# Patient Record
Sex: FEMALE | Race: WHITE | NOT HISPANIC OR LATINO | Employment: FULL TIME | URBAN - METROPOLITAN AREA
[De-identification: names, ages, dates, MRNs, and addresses within clinical notes are randomized per-mention and may not be internally consistent; named-entity substitution may affect disease eponyms.]

---

## 2018-03-16 ENCOUNTER — OFFICE VISIT CONVERTED (OUTPATIENT)
Dept: FAMILY MEDICINE CLINIC | Facility: CLINIC | Age: 23
End: 2018-03-16
Attending: NURSE PRACTITIONER

## 2018-04-17 ENCOUNTER — OFFICE VISIT CONVERTED (OUTPATIENT)
Dept: FAMILY MEDICINE CLINIC | Facility: CLINIC | Age: 23
End: 2018-04-17
Attending: NURSE PRACTITIONER

## 2018-08-03 ENCOUNTER — CONVERSION ENCOUNTER (OUTPATIENT)
Dept: FAMILY MEDICINE CLINIC | Facility: CLINIC | Age: 23
End: 2018-08-03

## 2018-08-03 ENCOUNTER — OFFICE VISIT CONVERTED (OUTPATIENT)
Dept: FAMILY MEDICINE CLINIC | Facility: CLINIC | Age: 23
End: 2018-08-03
Attending: NURSE PRACTITIONER

## 2018-10-22 ENCOUNTER — CONVERSION ENCOUNTER (OUTPATIENT)
Dept: FAMILY MEDICINE CLINIC | Facility: CLINIC | Age: 23
End: 2018-10-22

## 2018-10-22 ENCOUNTER — OFFICE VISIT CONVERTED (OUTPATIENT)
Dept: FAMILY MEDICINE CLINIC | Facility: CLINIC | Age: 23
End: 2018-10-22
Attending: NURSE PRACTITIONER

## 2018-11-08 ENCOUNTER — OFFICE VISIT CONVERTED (OUTPATIENT)
Dept: FAMILY MEDICINE CLINIC | Facility: CLINIC | Age: 23
End: 2018-11-08
Attending: NURSE PRACTITIONER

## 2018-12-13 ENCOUNTER — CONVERSION ENCOUNTER (OUTPATIENT)
Dept: FAMILY MEDICINE CLINIC | Facility: CLINIC | Age: 23
End: 2018-12-13

## 2018-12-13 ENCOUNTER — OFFICE VISIT CONVERTED (OUTPATIENT)
Dept: FAMILY MEDICINE CLINIC | Facility: CLINIC | Age: 23
End: 2018-12-13
Attending: NURSE PRACTITIONER

## 2019-02-06 ENCOUNTER — OFFICE VISIT CONVERTED (OUTPATIENT)
Dept: FAMILY MEDICINE CLINIC | Facility: CLINIC | Age: 24
End: 2019-02-06
Attending: NURSE PRACTITIONER

## 2019-02-23 ENCOUNTER — HOSPITAL ENCOUNTER (OUTPATIENT)
Dept: URGENT CARE | Facility: CLINIC | Age: 24
Discharge: HOME | End: 2019-02-23
Attending: NURSE PRACTITIONER

## 2019-03-15 ENCOUNTER — CONVERSION ENCOUNTER (OUTPATIENT)
Dept: FAMILY MEDICINE CLINIC | Facility: CLINIC | Age: 24
End: 2019-03-15

## 2019-03-15 ENCOUNTER — OFFICE VISIT CONVERTED (OUTPATIENT)
Dept: FAMILY MEDICINE CLINIC | Facility: CLINIC | Age: 24
End: 2019-03-15
Attending: NURSE PRACTITIONER

## 2019-03-28 ENCOUNTER — HOSPITAL ENCOUNTER (OUTPATIENT)
Dept: URGENT CARE | Facility: CLINIC | Age: 24
Discharge: HOME | End: 2019-03-28
Attending: NURSE PRACTITIONER

## 2019-06-17 ENCOUNTER — HOSPITAL ENCOUNTER (OUTPATIENT)
Dept: URGENT CARE | Facility: CLINIC | Age: 24
Discharge: HOME | End: 2019-06-17
Attending: EMERGENCY MEDICINE

## 2019-06-26 ENCOUNTER — OFFICE VISIT CONVERTED (OUTPATIENT)
Dept: FAMILY MEDICINE CLINIC | Facility: CLINIC | Age: 24
End: 2019-06-26
Attending: NURSE PRACTITIONER

## 2019-07-01 ENCOUNTER — HOSPITAL ENCOUNTER (OUTPATIENT)
Dept: OTHER | Facility: HOSPITAL | Age: 24
Discharge: HOME | End: 2019-07-01
Attending: NURSE PRACTITIONER

## 2019-07-01 LAB — HCG INTACT+B SERPL-ACNC: ABNORMAL M[IU]/ML (ref 0–5)

## 2019-12-27 ENCOUNTER — OFFICE VISIT CONVERTED (OUTPATIENT)
Dept: FAMILY MEDICINE CLINIC | Facility: CLINIC | Age: 24
End: 2019-12-27
Attending: NURSE PRACTITIONER

## 2020-02-10 ENCOUNTER — OFFICE VISIT CONVERTED (OUTPATIENT)
Dept: FAMILY MEDICINE CLINIC | Facility: CLINIC | Age: 25
End: 2020-02-10
Attending: NURSE PRACTITIONER

## 2020-02-18 ENCOUNTER — OFFICE VISIT CONVERTED (OUTPATIENT)
Dept: FAMILY MEDICINE CLINIC | Facility: CLINIC | Age: 25
End: 2020-02-18
Attending: NURSE PRACTITIONER

## 2020-02-24 ENCOUNTER — HOSPITAL ENCOUNTER (OUTPATIENT)
Dept: FAMILY MEDICINE CLINIC | Facility: CLINIC | Age: 25
Discharge: HOME | End: 2020-02-24
Attending: NURSE PRACTITIONER

## 2020-02-24 ENCOUNTER — OFFICE VISIT CONVERTED (OUTPATIENT)
Dept: FAMILY MEDICINE CLINIC | Facility: CLINIC | Age: 25
End: 2020-02-24
Attending: NURSE PRACTITIONER

## 2020-02-25 LAB
ALBUMIN SERPL-MCNC: 4.3 G/DL (ref 3.5–5)
ALBUMIN/GLOB SERPL: 1.3 {RATIO} (ref 1.4–2.6)
ALP SERPL-CCNC: 70 U/L (ref 42–98)
ALT SERPL-CCNC: 15 U/L (ref 10–40)
ANION GAP SERPL CALC-SCNC: 18 MMOL/L (ref 8–19)
AST SERPL-CCNC: 18 U/L (ref 15–50)
BASOPHILS # BLD AUTO: 0.07 10*3/UL (ref 0–0.2)
BASOPHILS NFR BLD AUTO: 0.6 % (ref 0–3)
BILIRUB SERPL-MCNC: <0.15 MG/DL (ref 0.2–1.3)
BUN SERPL-MCNC: 14 MG/DL (ref 5–25)
BUN/CREAT SERPL: 18 {RATIO} (ref 6–20)
CALCIUM SERPL-MCNC: 9.5 MG/DL (ref 8.7–10.4)
CHLORIDE SERPL-SCNC: 104 MMOL/L (ref 99–111)
CONV ABS IMM GRAN: 0.09 10*3/UL (ref 0–0.2)
CONV CO2: 22 MMOL/L (ref 22–32)
CONV IMMATURE GRAN: 0.7 % (ref 0–1.8)
CONV TOTAL PROTEIN: 7.5 G/DL (ref 6.3–8.2)
CREAT UR-MCNC: 0.79 MG/DL (ref 0.5–0.9)
DEPRECATED RDW RBC AUTO: 48.4 FL (ref 36.4–46.3)
EOSINOPHIL # BLD AUTO: 0.15 10*3/UL (ref 0–0.7)
EOSINOPHIL # BLD AUTO: 1.2 % (ref 0–7)
ERYTHROCYTE [DISTWIDTH] IN BLOOD BY AUTOMATED COUNT: 16.1 % (ref 11.7–14.4)
GFR SERPLBLD BASED ON 1.73 SQ M-ARVRAT: >60 ML/MIN/{1.73_M2}
GLOBULIN UR ELPH-MCNC: 3.2 G/DL (ref 2–3.5)
GLUCOSE SERPL-MCNC: 83 MG/DL (ref 65–99)
HCT VFR BLD AUTO: 41.7 % (ref 37–47)
HGB BLD-MCNC: 13 G/DL (ref 12–16)
IRON SATN MFR SERPL: 7 % (ref 20–55)
IRON SERPL-MCNC: 32 UG/DL (ref 60–170)
LYMPHOCYTES # BLD AUTO: 2.53 10*3/UL (ref 1–5)
LYMPHOCYTES NFR BLD AUTO: 20.8 % (ref 20–45)
MCH RBC QN AUTO: 25.4 PG (ref 27–31)
MCHC RBC AUTO-ENTMCNC: 31.2 G/DL (ref 33–37)
MCV RBC AUTO: 81.6 FL (ref 81–99)
MONOCYTES # BLD AUTO: 0.84 10*3/UL (ref 0.2–1.2)
MONOCYTES NFR BLD AUTO: 6.9 % (ref 3–10)
NEUTROPHILS # BLD AUTO: 8.46 10*3/UL (ref 2–8)
NEUTROPHILS NFR BLD AUTO: 69.8 % (ref 30–85)
NRBC CBCN: 0 % (ref 0–0.7)
OSMOLALITY SERPL CALC.SUM OF ELEC: 288 MOSM/KG (ref 273–304)
PLATELET # BLD AUTO: 312 10*3/UL (ref 130–400)
PMV BLD AUTO: 10.5 FL (ref 9.4–12.3)
POTASSIUM SERPL-SCNC: 4.5 MMOL/L (ref 3.5–5.3)
RBC # BLD AUTO: 5.11 10*6/UL (ref 4.2–5.4)
SODIUM SERPL-SCNC: 139 MMOL/L (ref 135–147)
TIBC SERPL-MCNC: 468 UG/DL (ref 245–450)
TRANSFERRIN SERPL-MCNC: 327 MG/DL (ref 250–380)
TSH SERPL-ACNC: 3.29 M[IU]/L (ref 0.27–4.2)
VIT B12 SERPL-MCNC: 396 PG/ML (ref 211–911)
WBC # BLD AUTO: 12.14 10*3/UL (ref 4.8–10.8)

## 2020-02-26 LAB
CONV ANTI MICROSOMAL AB: 58 IU/ML (ref 0–34)
THYROGLOBULIN ANTIBODY: 4.7 IU/ML (ref 0–0.9)

## 2020-03-09 ENCOUNTER — HOSPITAL ENCOUNTER (OUTPATIENT)
Dept: FAMILY MEDICINE CLINIC | Facility: CLINIC | Age: 25
Discharge: HOME | End: 2020-03-09
Attending: NURSE PRACTITIONER

## 2020-03-09 LAB
BASOPHILS # BLD AUTO: 0.09 10*3/UL (ref 0–0.2)
BASOPHILS NFR BLD AUTO: 1 % (ref 0–3)
CONV ABS IMM GRAN: 0.03 10*3/UL (ref 0–0.2)
CONV IMMATURE GRAN: 0.3 % (ref 0–1.8)
DEPRECATED RDW RBC AUTO: 48.8 FL (ref 36.4–46.3)
EOSINOPHIL # BLD AUTO: 0.16 10*3/UL (ref 0–0.7)
EOSINOPHIL # BLD AUTO: 1.7 % (ref 0–7)
ERYTHROCYTE [DISTWIDTH] IN BLOOD BY AUTOMATED COUNT: 16.1 % (ref 11.7–14.4)
HCT VFR BLD AUTO: 41.7 % (ref 37–47)
HGB BLD-MCNC: 12.9 G/DL (ref 12–16)
LYMPHOCYTES # BLD AUTO: 2.99 10*3/UL (ref 1–5)
LYMPHOCYTES NFR BLD AUTO: 32.4 % (ref 20–45)
MCH RBC QN AUTO: 25.7 PG (ref 27–31)
MCHC RBC AUTO-ENTMCNC: 30.9 G/DL (ref 33–37)
MCV RBC AUTO: 83.1 FL (ref 81–99)
MONOCYTES # BLD AUTO: 0.69 10*3/UL (ref 0.2–1.2)
MONOCYTES NFR BLD AUTO: 7.5 % (ref 3–10)
NEUTROPHILS # BLD AUTO: 5.27 10*3/UL (ref 2–8)
NEUTROPHILS NFR BLD AUTO: 57.1 % (ref 30–85)
NRBC CBCN: 0 % (ref 0–0.7)
PLATELET # BLD AUTO: 347 10*3/UL (ref 130–400)
PMV BLD AUTO: 9.7 FL (ref 9.4–12.3)
RBC # BLD AUTO: 5.02 10*6/UL (ref 4.2–5.4)
WBC # BLD AUTO: 9.23 10*3/UL (ref 4.8–10.8)

## 2020-03-19 ENCOUNTER — TELEMEDICINE CONVERTED (OUTPATIENT)
Dept: FAMILY MEDICINE CLINIC | Facility: CLINIC | Age: 25
End: 2020-03-19
Attending: NURSE PRACTITIONER

## 2020-04-08 ENCOUNTER — HOSPITAL ENCOUNTER (OUTPATIENT)
Dept: LAB | Facility: HOSPITAL | Age: 25
Discharge: HOME | End: 2020-04-08
Attending: NURSE PRACTITIONER

## 2020-04-08 LAB
BASOPHILS # BLD AUTO: 0.08 10*3/UL (ref 0–0.2)
BASOPHILS NFR BLD AUTO: 1.1 % (ref 0–3)
CONV ABS IMM GRAN: 0.02 10*3/UL (ref 0–0.2)
CONV IMMATURE GRAN: 0.3 % (ref 0–1.8)
DEPRECATED RDW RBC AUTO: 46 FL (ref 36.4–46.3)
EOSINOPHIL # BLD AUTO: 0.14 10*3/UL (ref 0–0.7)
EOSINOPHIL # BLD AUTO: 1.9 % (ref 0–7)
ERYTHROCYTE [DISTWIDTH] IN BLOOD BY AUTOMATED COUNT: 14.4 % (ref 11.7–14.4)
HCT VFR BLD AUTO: 41.6 % (ref 37–47)
HGB BLD-MCNC: 13 G/DL (ref 12–16)
LYMPHOCYTES # BLD AUTO: 2.63 10*3/UL (ref 1–5)
LYMPHOCYTES NFR BLD AUTO: 35.4 % (ref 20–45)
MCH RBC QN AUTO: 27 PG (ref 27–31)
MCHC RBC AUTO-ENTMCNC: 31.3 G/DL (ref 33–37)
MCV RBC AUTO: 86.3 FL (ref 81–99)
MONOCYTES # BLD AUTO: 0.6 10*3/UL (ref 0.2–1.2)
MONOCYTES NFR BLD AUTO: 8.1 % (ref 3–10)
NEUTROPHILS # BLD AUTO: 3.96 10*3/UL (ref 2–8)
NEUTROPHILS NFR BLD AUTO: 53.2 % (ref 30–85)
NRBC CBCN: 0 % (ref 0–0.7)
PLATELET # BLD AUTO: 317 10*3/UL (ref 130–400)
PMV BLD AUTO: 10 FL (ref 9.4–12.3)
RBC # BLD AUTO: 4.82 10*6/UL (ref 4.2–5.4)
T4 FREE SERPL-MCNC: 1.3 NG/DL (ref 0.9–1.8)
TSH SERPL-ACNC: 2.34 M[IU]/L (ref 0.27–4.2)
WBC # BLD AUTO: 7.43 10*3/UL (ref 4.8–10.8)

## 2020-06-11 ENCOUNTER — OFFICE VISIT CONVERTED (OUTPATIENT)
Dept: FAMILY MEDICINE CLINIC | Facility: CLINIC | Age: 25
End: 2020-06-11
Attending: NURSE PRACTITIONER

## 2020-06-11 ENCOUNTER — HOSPITAL ENCOUNTER (OUTPATIENT)
Dept: FAMILY MEDICINE CLINIC | Facility: CLINIC | Age: 25
Discharge: HOME | End: 2020-06-11
Attending: NURSE PRACTITIONER

## 2020-06-11 ENCOUNTER — CONVERSION ENCOUNTER (OUTPATIENT)
Dept: FAMILY MEDICINE CLINIC | Facility: CLINIC | Age: 25
End: 2020-06-11

## 2020-06-11 LAB
IRON SATN MFR SERPL: 20 % (ref 20–55)
IRON SERPL-MCNC: 82 UG/DL (ref 60–170)
TIBC SERPL-MCNC: 415 UG/DL (ref 245–450)
TRANSFERRIN SERPL-MCNC: 290 MG/DL (ref 250–380)

## 2020-10-08 ENCOUNTER — CONVERSION ENCOUNTER (OUTPATIENT)
Dept: FAMILY MEDICINE CLINIC | Facility: CLINIC | Age: 25
End: 2020-10-08

## 2020-10-08 ENCOUNTER — OFFICE VISIT CONVERTED (OUTPATIENT)
Dept: FAMILY MEDICINE CLINIC | Facility: CLINIC | Age: 25
End: 2020-10-08
Attending: NURSE PRACTITIONER

## 2020-10-09 ENCOUNTER — HOSPITAL ENCOUNTER (OUTPATIENT)
Dept: ULTRASOUND IMAGING | Facility: HOSPITAL | Age: 25
Discharge: HOME | End: 2020-10-09
Attending: NURSE PRACTITIONER

## 2020-10-09 ENCOUNTER — HOSPITAL ENCOUNTER (OUTPATIENT)
Dept: LAB | Facility: HOSPITAL | Age: 25
Discharge: HOME | End: 2020-10-09
Attending: NURSE PRACTITIONER

## 2020-10-09 LAB
ALBUMIN SERPL-MCNC: 4.1 G/DL (ref 3.5–5)
ALBUMIN/GLOB SERPL: 1.3 {RATIO} (ref 1.4–2.6)
ALP SERPL-CCNC: 81 U/L (ref 42–98)
ALT SERPL-CCNC: 12 U/L (ref 10–40)
ANION GAP SERPL CALC-SCNC: 12 MMOL/L (ref 8–19)
AST SERPL-CCNC: 18 U/L (ref 15–50)
BASOPHILS # BLD AUTO: 0.07 10*3/UL (ref 0–0.2)
BASOPHILS NFR BLD AUTO: 0.7 % (ref 0–3)
BILIRUB SERPL-MCNC: 0.35 MG/DL (ref 0.2–1.3)
BUN SERPL-MCNC: 9 MG/DL (ref 5–25)
BUN/CREAT SERPL: 12 {RATIO} (ref 6–20)
CALCIUM SERPL-MCNC: 9.1 MG/DL (ref 8.7–10.4)
CHLORIDE SERPL-SCNC: 105 MMOL/L (ref 99–111)
CHOLEST SERPL-MCNC: 137 MG/DL (ref 107–200)
CHOLEST/HDLC SERPL: 3.9 {RATIO} (ref 3–6)
CONV ABS IMM GRAN: 0.04 10*3/UL (ref 0–0.2)
CONV CO2: 26 MMOL/L (ref 22–32)
CONV IMMATURE GRAN: 0.4 % (ref 0–1.8)
CONV TOTAL PROTEIN: 7.2 G/DL (ref 6.3–8.2)
CREAT UR-MCNC: 0.78 MG/DL (ref 0.5–0.9)
DEPRECATED RDW RBC AUTO: 39 FL (ref 36.4–46.3)
EOSINOPHIL # BLD AUTO: 0.15 10*3/UL (ref 0–0.7)
EOSINOPHIL # BLD AUTO: 1.5 % (ref 0–7)
ERYTHROCYTE [DISTWIDTH] IN BLOOD BY AUTOMATED COUNT: 12 % (ref 11.7–14.4)
GFR SERPLBLD BASED ON 1.73 SQ M-ARVRAT: >60 ML/MIN/{1.73_M2}
GLOBULIN UR ELPH-MCNC: 3.1 G/DL (ref 2–3.5)
GLUCOSE SERPL-MCNC: 60 MG/DL (ref 65–99)
HCT VFR BLD AUTO: 40.8 % (ref 37–47)
HDLC SERPL-MCNC: 35 MG/DL (ref 40–60)
HGB BLD-MCNC: 12.9 G/DL (ref 12–16)
LDLC SERPL CALC-MCNC: 85 MG/DL (ref 70–100)
LYMPHOCYTES # BLD AUTO: 1.88 10*3/UL (ref 1–5)
LYMPHOCYTES NFR BLD AUTO: 19.3 % (ref 20–45)
MCH RBC QN AUTO: 28.4 PG (ref 27–31)
MCHC RBC AUTO-ENTMCNC: 31.6 G/DL (ref 33–37)
MCV RBC AUTO: 89.7 FL (ref 81–99)
MONOCYTES # BLD AUTO: 0.74 10*3/UL (ref 0.2–1.2)
MONOCYTES NFR BLD AUTO: 7.6 % (ref 3–10)
NEUTROPHILS # BLD AUTO: 6.88 10*3/UL (ref 2–8)
NEUTROPHILS NFR BLD AUTO: 70.5 % (ref 30–85)
NRBC CBCN: 0 % (ref 0–0.7)
OSMOLALITY SERPL CALC.SUM OF ELEC: 285 MOSM/KG (ref 273–304)
PLATELET # BLD AUTO: 271 10*3/UL (ref 130–400)
PMV BLD AUTO: 9.7 FL (ref 9.4–12.3)
POTASSIUM SERPL-SCNC: 3.8 MMOL/L (ref 3.5–5.3)
RBC # BLD AUTO: 4.55 10*6/UL (ref 4.2–5.4)
SODIUM SERPL-SCNC: 139 MMOL/L (ref 135–147)
TRIGL SERPL-MCNC: 86 MG/DL (ref 40–150)
TSH SERPL-ACNC: 2.38 M[IU]/L (ref 0.27–4.2)
VLDLC SERPL-MCNC: 17 MG/DL (ref 5–37)
WBC # BLD AUTO: 9.76 10*3/UL (ref 4.8–10.8)

## 2020-10-19 ENCOUNTER — HOSPITAL ENCOUNTER (OUTPATIENT)
Dept: FAMILY MEDICINE CLINIC | Facility: CLINIC | Age: 25
Discharge: HOME | End: 2020-10-19
Attending: NURSE PRACTITIONER

## 2020-10-19 LAB
EST. AVERAGE GLUCOSE BLD GHB EST-MCNC: 97 MG/DL
HBA1C MFR BLD: 5 % (ref 3.5–5.7)

## 2021-04-12 ENCOUNTER — HOSPITAL ENCOUNTER (OUTPATIENT)
Dept: FAMILY MEDICINE CLINIC | Facility: CLINIC | Age: 26
Discharge: HOME | End: 2021-04-12
Attending: NURSE PRACTITIONER

## 2021-04-12 ENCOUNTER — OFFICE VISIT CONVERTED (OUTPATIENT)
Dept: FAMILY MEDICINE CLINIC | Facility: CLINIC | Age: 26
End: 2021-04-12
Attending: NURSE PRACTITIONER

## 2021-05-10 ENCOUNTER — OFFICE VISIT CONVERTED (OUTPATIENT)
Dept: FAMILY MEDICINE CLINIC | Facility: CLINIC | Age: 26
End: 2021-05-10
Attending: NURSE PRACTITIONER

## 2021-05-10 LAB
BILIRUB UR QL STRIP: NEGATIVE
COLOR UR: YELLOW
CONV CLARITY OF URINE: CLEAR
CONV PROTEIN IN URINE BY AUTOMATED TEST STRIP: NEGATIVE
CONV UROBILINOGEN IN URINE BY AUTOMATED TEST STRIP: NORMAL
GLUCOSE UR QL: NEGATIVE
HGB UR QL STRIP: NORMAL
KETONES UR QL STRIP: NORMAL
LEUKOCYTE ESTERASE UR QL STRIP: NEGATIVE
NITRITE UR QL STRIP: NEGATIVE
PH UR STRIP.AUTO: 5.5 [PH]
SP GR UR: 1.02

## 2021-05-13 NOTE — PROGRESS NOTES
Progress Note      Patient Name: Alicia Rodriguez   Patient ID: [id number]   Sex: Female   YOB: 1995    Primary Care Provider: Carline AZEVEDO   Referring Provider: [facility]    Visit Date: June 11, 2020    Provider: ROBERTO CARLOS Carnes   Location: [facility]   Location Address: [address] [place] [address]  [id number]   Location Phone: [phone]          Chief Complaint  · Follow up for Migraine Headaches      History Of Present Illness  Alicia Rodriguez is a 24 year old /White female who presents for evaluation and treatment of:      Follow up for Migraine headaches    Had 2-3 migraines in the last 4 months. Currently on Emgality. pt reports this migraine started on Friday and she has taken Maxalt daily with some relief then it comes back. If she takes two she is too sleepy.    Has been taking buspar 10mg once daily at bedtime instead of 3 times a day. Seems to help more.  No refill needed at this time.    Taking OTC ferrous sulfate 325mg for anemia. Lab work due to check iron profile. (order previously placed in chart)    Pt also c/o sinus drainage due to allergies. Does not take any allergy meds currently. Used to take Allegra.       Past Medical History  Disease Name Date Onset Notes   Allergic rhinitis, chronic --  --    Anxiety disorder 11/20/2017 --    Hypertension --  --    Hyperthyroidism --  --    Hypothyroidism 02/27/2020 --    Migraine headache 02/10/2020 --          Past Surgical History  Procedure Name Date Notes   Appendectomy --  --    Cholecstectomy --  --    Tonsilectomy --  --          Medication List  Name Date Started Instructions   B-Complex oral tablet  take 1 tablet by oral route daily   buspirone 10 mg oral tablet 05/27/2020 TAKE 1 TABLET BY MOUTH THREE TIMES A DAY   Emgality Pen 120 mg/mL subcutaneous pen injector 06/11/2020 inject 1 milliliter (120 mg) by subcutaneous route once a month in the abdomen, thigh, outer upper arm, or  bottocks   ferrous sulfate 325 mg (65 mg iron) oral tablet  take 1 tablet (325 mg) by oral route 2 times per day   Loestrin Fe 1/20 (28-Day) 1 mg-20 mcg (21)/75 mg (7) oral tablet  take 1 tablet by oral route once daily   Nurtec ODT 75 mg oral tablet,disintegrating 06/11/2020 take 1 tablet (75 mg) and place on top of tongue, allow to dissolve then swallow by oral route once as needed for migraine; max 1 dose/24 hrs   Synthroid 25 mcg oral tablet 04/09/2020 take 1 tablet (25 mcg) by oral route once daily on an empty stomach 30 minutes before breakfast   Zofran 4 mg oral tablet 12/27/2019 take 1 tablet by oral route Q8H PRN nausea         Allergy List  Allergen Name Date Reaction Notes   NO KNOWN DRUG ALLERGIES --  --  --          Family Medical History  Disease Name Relative/Age Notes   Hypothyroidism  --    Heart Disease Grandfather (paternal)/   --    Hypertension Father/  Grandfather (maternal)/  Mother/   --    Diabetes, unspecified type Grandfather (maternal)/  Grandmother (maternal)/  Mother/   Mother; Grandmother (maternal)  Grandfather (maternal)   Prostate cancer Grandfather (paternal)/   Grandfather (paternal)   Renal Calculus Mother/   Mother   Family history of colon cancer Grandfather (paternal)/70s   Grandfather (paternal)/70s         Social History  Finding Status Start/Stop Quantity Notes   Alcohol Never 0/0 --  drinks no   Caffeine Current some day 0/0 --  drinks occasionally; tea and soft drinks; 1-2 times per day  drinks occasionally; tea; 1-2 times per day   Second hand smoke exposure Never 0/0 --  no   Tobacco Never --/-- --  --          Immunizations  NameDate Admin Mfg Trade Name Lot Number Route Inj VIS Given VIS Publication   Tsypokhzt24/20/2017 [facility] Fluzone Quadrivalent [id number]  11/20/2017 08/07/2015   Comments: Pt toelrated         Review of Systems  · Constitutional  o Denies  o : fatigue, fever, weight gain, weight loss, chills  · HENT  o Admits  o : nasal discharge, postnasal  "drainage  o Denies  o : ear pain, sore throat  · Cardiovascular  o Denies  o : chest Pain, palpitations, edema (swelling)  · Respiratory  o Denies  o : frequent cough, shortness of breath  · Gastrointestinal  o Denies  o : nausea, vomiting, changes in bowel habits  · Genitourinary  o Denies  o : dysuria, urinary frequency, urinary urgency, polyuria  · Neurologic  o Admits  o : headache  o Denies  o : tingling or numbness, dizziness  · Musculoskeletal  o Denies  o : joint pain, myalgias  · Endocrine  o Denies  o : polydipsia, polyphagia  · Psychiatric  o Denies  o : mood changes, memory changes, SI/HI  · Allergic-Immunologic  o Admits  o : seasonal allergies  o Denies  o : eczema, urticaria      Vitals  Date Time BP Position Site L\R Cuff Size HR RR TEMP (F) WT  HT  BMI kg/m2 BSA m2 O2 Sat HC       02/18/2020 02:40 /75 Sitting    73 - R 18 98.5 144lbs 0oz 5'  5\" 23.96 1.73 99 %    02/24/2020 02:36 /83 Sitting    91 - R 12  145lbs 0oz 5'  5\" 24.13 1.74 99 %    06/11/2020 11:10 /85 Sitting    91 - R 18 97.6 144lbs 4oz 5'  5\" 24 1.73 99 %          Physical Examination  · Constitutional  o Appearance  o : well-nourished, in no acute distress  · Eyes  o Conjunctivae  o : conjunctivae normal  o Sclerae  o : sclerae white  o Pupils and Irises  o : pupils equal and round  o Eyelids/Ocular Adnexae  o : eyelid appearance normal, no exudates present  · Neck  o Inspection/Palpation  o : normal appearance, no masses or tenderness, trachea midline  o Range of Motion  o : cervical range of motion within normal limits  o Thyroid  o : gland size normal, nontender, no nodules or masses present on palpation  · Respiratory  o Respiratory Effort  o : breathing unlabored  o Inspection of Chest  o : normal appearance  o Auscultation of Lungs  o : normal breath sounds throughout inspiration and expiration  · Cardiovascular  o Heart  o :   § Auscultation of Heart  § : regular rate and rhythm, no murmurs, gallops or " rubs  o Peripheral Vascular System  o :   § Carotid Arteries  § : normal pulses bilaterally, no bruits present  · Gastrointestinal  o Abdominal Examination  o : abdomen nontender to palpation, tone normal without rigidity or guarding, no masses present, bowel sounds present  · Skin and Subcutaneous Tissue  o General Inspection  o : no rashes or lesions present, no areas of discoloration  o Body Hair  o : hair normal for age, general body hair distribution normal for age  o Digits and Nails  o : no clubbing, cyanosis, deformities or edema present, normal appearing nails  · Neurologic  o Mental Status Examination  o :   § Orientation  § : grossly oriented to person, place and time  o Gait and Station  o : normal gait, able to stand without difficulty  · Psychiatric  o Judgement and Insight  o : judgment and insight intact  o Mood and Affect  o : mood normal, affect appropriate          Assessment  · Allergic rhinitis due to allergen     477.9/J30.9  · Anxiety disorder     300.00/F41.9  · Migraine headache     346.90/G43.909  · History of miscarriage     V13.29/Z87.59      Plan  · Orders  o ACO-39: Current medications updated and reviewed () - - 06/11/2020  · Medications  o Emgality Pen 120 mg/mL subcutaneous pen injector   SIG: inject 1 milliliter (120 mg) by subcutaneous route once a month in the abdomen, thigh, outer upper arm, or bottocks   DISP: (1) 1 ml syringe with 5 refills  Refilled on 06/11/2020     o Maxalt 10 mg oral tablet   SIG: take 1 tablet (10 mg) by oral route once, may repeat at 2 hour intervals; do not exceed 30 mg in 24 hours for 30 days   DISP: (30) tablets with 1 refills  Discontinued on 06/11/2020     o propranolol 40 mg oral tablet   SIG: take 1 tablet (40 mg) by oral route 2 times per day for 30 days   DISP: (60) tablets with 2 refills  Discontinued on 06/11/2020     o Medications have been Reconciled  o Transition of Care or Provider Policy  · Instructions  o Patient was given an  SSRI/SSNRI medication and warned of possible side effects of the medication including potential for increased risk of suicidal thoughts and feelings. Patient was instructed that if they begin to exhibit any of these effects they will discontinue the medication immediately and contact our office or the ER ASAP.  o Patient was educated/instructed on their diagnosis, treatment and medications prior to discharge from the clinic today.  o Call the office with any concerns or questions.  · Disposition  o Return to Office in 4 months.            Electronically Signed by: ROBERTO CARLOS Carnes -Author on June 11, 2020 05:51:26 PM

## 2021-05-13 NOTE — PROGRESS NOTES
Progress Note      Patient Name: Alicia Rodriguez   Patient ID: [id number]   Sex: Female   YOB: 1995    Primary Care Provider: Carline AZEVEDO   Referring Provider: [facility]    Visit Date: October 8, 2020    Provider: ROBERTO CARLOS Carnes   Location: [facility]   Location Address: [address] [place] [address]  [id number]   Location Phone: [phone]          Chief Complaint  · 4 Month Follow up for Anxiety, Depression, and Migraine headaches.      History Of Present Illness  Alicia Rodriguez is a 24 year old /White female who presents for evaluation and treatment of:      4 Month Follow up for Anxiety, Depression, and Migraine headaches.  Last lab work done 6/2020  Med refills needed    Migraines - one per month. pt reports doing well with Emgality. Lasting approx. 2-3 hours. pt admits nausea and light sensitivity.     Last eye exam - 8.2019.    Anxiety/Depression - pt stopped doing well. pt reports she isn't short tempered anymore.    PCOS - hx of pcos with right lower abd pain. pt reports irregular menses with large clots. Pt reports that she was recommend ocp per gyn but has started them d/t previous mood changes with med.          flu shot- declined  Pap-9/22/20 EPW       Past Medical History  Disease Name Date Onset Notes   Allergic rhinitis, chronic --  --    Anxiety disorder 11/20/2017 --    Hypertension --  --    Hyperthyroidism --  --    Hypothyroidism 02/27/2020 --    Migraine headache 02/10/2020 --          Past Surgical History  Procedure Name Date Notes   Appendectomy --  --    Cholecstectomy --  --    Tonsilectomy --  --          Medication List  Name Date Started Instructions   B-Complex oral tablet  take 1 tablet by oral route daily   Emgality Pen 120 mg/mL subcutaneous pen injector 10/08/2020 inject 1 milliliter (120 mg) by subcutaneous route once a month in the abdomen, thigh, outer upper arm, or bottocks   ferrous sulfate 325  mg (65 mg iron) oral tablet  take 1 tablet (325 mg) by oral route 2 times per day   Loestrin Fe 1/20 (28-Day) 1 mg-20 mcg (21)/75 mg (7) oral tablet  take 1 tablet by oral route once daily   Nurtec ODT 75 mg oral tablet,disintegrating 06/11/2020 take 1 tablet (75 mg) and place on top of tongue, allow to dissolve then swallow by oral route once as needed for migraine; max 1 dose/24 hrs   Synthroid 25 mcg oral tablet 10/08/2020 take 1 tablet (25 mcg) by oral route once daily on an empty stomach 30 minutes before breakfast   Zofran 4 mg oral tablet 09/03/2020 take 1 tablet by oral route Q8H PRN nausea         Allergy List  Allergen Name Date Reaction Notes   NO KNOWN DRUG ALLERGIES --  --  --          Family Medical History  Disease Name Relative/Age Notes   Hypothyroidism  --    Heart Disease Grandfather (paternal)/   --    Hypertension Father/  Grandfather (maternal)/  Mother/   --    Diabetes, unspecified type Grandfather (maternal)/  Grandmother (maternal)/  Mother/   Mother; Grandmother (maternal)  Grandfather (maternal)   Prostate cancer Grandfather (paternal)/   Grandfather (paternal)   Renal Calculus Mother/   Mother   Family history of colon cancer Grandfather (paternal)/70s   Grandfather (paternal)/70s         Social History  Finding Status Start/Stop Quantity Notes   Alcohol Never 0/0 --  drinks no   Caffeine Current some day 0/0 --  drinks occasionally; tea and soft drinks; 1-2 times per day  drinks occasionally; tea; 1-2 times per day   Second hand smoke exposure Never 0/0 --  no   Tobacco Never --/-- --  --          Immunizations  NameDate Admin Mfg Trade Name Lot Number Route Inj VIS Given VIS Publication   Plhwluehw82/20/2017 [facility] Fluzone Quadrivalent [id number]  11/20/2017 08/07/2015   Comments: Pt toelrated         Review of Systems  · Constitutional  o Denies  o : fatigue, fever, weight gain, weight loss, chills  · Cardiovascular  o Denies  o : chest Pain, palpitations, edema  "(swelling)  · Respiratory  o Denies  o : frequent cough, shortness of breath  · Gastrointestinal  o Denies  o : nausea, vomiting, changes in bowel habits  · Genitourinary  o Admits  o : dysmenorrhea, metrorrhagia  o Denies  o : dysuria, urinary frequency, urinary urgency, polyuria  · Neurologic  o Admits  o : headache  o Denies  o : tingling or numbness, dizziness  · Endocrine  o Denies  o : polydipsia, polyphagia  · Psychiatric  o Admits  o : difficulty sleeping  o Denies  o : mood changes, memory changes, SI/HI  · Allergic-Immunologic  o Admits  o : seasonal allergies  o Denies  o : eczema, urticaria      Vitals  Date Time BP Position Site L\R Cuff Size HR RR TEMP (F) WT  HT  BMI kg/m2 BSA m2 O2 Sat FR L/min FiO2 HC       02/24/2020 02:36 /83 Sitting    91 - R 12  145lbs 0oz 5'  5\" 24.13 1.74 99 %      06/11/2020 11:10 /85 Sitting    91 - R 18 97.6 144lbs 4oz 5'  5\" 24 1.73 99 %      10/08/2020 03:29 /77 Sitting    76 - R 12 97.5 148lbs 8oz 5'  5\" 24.71 1.76 99 %            Physical Examination  · Constitutional  o Appearance  o : well-nourished, in no acute distress  · Eyes  o Conjunctivae  o : conjunctivae normal  o Sclerae  o : sclerae white  o Pupils and Irises  o : pupils equal and round  o Eyelids/Ocular Adnexae  o : eyelid appearance normal, no exudates present  · Neck  o Inspection/Palpation  o : normal appearance, no masses or tenderness, trachea midline  o Range of Motion  o : cervical range of motion within normal limits  o Thyroid  o : gland size normal, nontender, no nodules or masses present on palpation  · Respiratory  o Respiratory Effort  o : breathing unlabored  o Inspection of Chest  o : normal appearance  o Auscultation of Lungs  o : normal breath sounds throughout inspiration and expiration  · Cardiovascular  o Heart  o :   § Auscultation of Heart  § : regular rate and rhythm, no murmurs, gallops or rubs  o Peripheral Vascular System  o :   § Carotid Arteries  § : normal " pulses bilaterally, no bruits present  · Gastrointestinal  o Abdominal Examination  o : abdomen nontender to palpation, tone normal without rigidity or guarding, no masses present, bowel sounds present  · Skin and Subcutaneous Tissue  o General Inspection  o : no rashes or lesions present, no areas of discoloration  o Body Hair  o : hair normal for age, general body hair distribution normal for age  o Digits and Nails  o : no clubbing, cyanosis, deformities or edema present, normal appearing nails  · Neurologic  o Mental Status Examination  o :   § Orientation  § : grossly oriented to person, place and time  o Gait and Station  o : normal gait, able to stand without difficulty  · Psychiatric  o Judgement and Insight  o : judgment and insight intact  o Mood and Affect  o : mood normal, affect appropriate          Assessment  · Need for influenza vaccination     V04.81/Z23  · Allergic rhinitis due to allergen     477.9/J30.9  · Anxiety disorder     300.00/F41.9  · Depression     311/F32.9  · Hypothyroidism     244.9/E03.9  · Migraine headache     346.90/G43.909  · PCOS (polycystic ovarian syndrome)     256.4/E28.2      Plan  · Orders  o Physical, Primary Care Panel (CBC, CMP, Lipid, TSH) [facility] (33085, [address]92, 22870, [address]707) - 300.00/F41.9, 311/F32.9, 346.90/G43.909 - 10/08/2020  o ACO-39: Current medications updated and reviewed (1159F, ) - - 10/08/2020  o ACO-14: Influenza immunization was not administered for reasons documented [facility] () - - 10/08/2020  o Transvaginal U/S (03719) - 256.4/E28.2 - 10/08/2020  · Medications  o Flonase Allergy Relief 50 mcg/actuation nasal spray,suspension   SIG: inhale 2 puffs by nasal route daily for 30 days to each nostril   DISP: (1) Device with 5 refills  Prescribed on 10/08/2020     o Emgality Pen 120 mg/mL subcutaneous pen injector   SIG: inject 1 milliliter (120 mg) by subcutaneous route once a month in the abdomen, thigh, outer upper arm, or bottocks   DISP: (1) Pen Needle  with 11 refills  Refilled on 10/08/2020     o Synthroid 25 mcg oral tablet   SIG: take 1 tablet (25 mcg) by oral route once daily on an empty stomach 30 minutes before breakfast   DISP: (90) Tablet with 1 refills  Refilled on 10/08/2020     o buspirone 10 mg oral tablet   SIG: TAKE 1 TABLET BY MOUTH THREE TIMES A DAY   DISP: (90) Tablet with 1 refills  Discontinued on 10/08/2020     o Medications have been Reconciled  o Transition of Care or Provider Policy  · Instructions  o Flu vaccine declined.  o Patient was given an SSRI/SSNRI medication and warned of possible side effects of the medication including potential for increased risk of suicidal thoughts and feelings. Patient was instructed that if they begin to exhibit any of these effects they will discontinue the medication immediately and contact our office or the ER ASAP.  o Patient was educated/instructed on their diagnosis, treatment and medications prior to discharge from the clinic today.  o Call the office with any concerns or questions.  · Disposition  o Return to Office in 6 months.            Electronically Signed by: ROBERTO CARLOS Carnes -Author on October 9, 2020 09:15:15 AM

## 2021-05-14 VITALS
SYSTOLIC BLOOD PRESSURE: 128 MMHG | HEART RATE: 76 BPM | TEMPERATURE: 97.5 F | OXYGEN SATURATION: 99 % | WEIGHT: 148.5 LBS | RESPIRATION RATE: 12 BRPM | BODY MASS INDEX: 24.74 KG/M2 | DIASTOLIC BLOOD PRESSURE: 77 MMHG | HEIGHT: 65 IN

## 2021-05-14 VITALS
OXYGEN SATURATION: 98 % | DIASTOLIC BLOOD PRESSURE: 89 MMHG | RESPIRATION RATE: 18 BRPM | BODY MASS INDEX: 25.99 KG/M2 | HEART RATE: 88 BPM | TEMPERATURE: 98.5 F | WEIGHT: 156 LBS | HEIGHT: 65 IN | SYSTOLIC BLOOD PRESSURE: 135 MMHG

## 2021-05-14 NOTE — PROGRESS NOTES
Progress Note      Patient Name: Alicia Rodriguez   Patient ID: [id number]   Sex: Female   YOB: 1995    Primary Care Provider: Carline AZEVEDO   Referring Provider: [facility]    Visit Date: April 12, 2021    Provider: ROBERTO CARLOS Carnes   Location: [facility]   Location Address: [address] [place] [address]  [id number]   Location Phone: [phone]          Chief Complaint  · 6 Month Follow up for Anxiety, Depression, and Migraine headaches.      History Of Present Illness  Alicia Rodriguez is a 25 year old /White female who presents for evaluation and treatment of:      6 Month Follow up for Anxiety, Depression, and Migraine headaches.  Last lab work done 10/2020  Med refills needed    Pt reported her allergies are bad right now. Taking Zyrtec. Admits to not using Flonase as often.    rash - urticaria in picture.    rash - sarah anterior shoulder/underarm region.     Migraines - pt has had 1 migraine in months. lasting approx. 1 hour and she took Imitrex with resolution.    Anxiety/Depression - pt reports she wants to restart med.     flu shot-declined  Pap-9/22/20 EPW       Past Medical History  Disease Name Date Onset Notes   Allergic rhinitis, chronic --  --    Anxiety disorder 11/20/2017 --    Hypertension --  --    Hyperthyroidism --  --    Hypothyroidism 02/27/2020 --    Migraine headache 02/10/2020 --    Pap smear for cervical cancer screening 9/22/20 EPW         Past Surgical History  Procedure Name Date Notes   Appendectomy --  --    Cholecstectomy --  --    Tonsilectomy --  --          Medication List  Name Date Started Instructions   B-Complex oral tablet  take 1 tablet by oral route daily   Emgality Pen 120 mg/mL subcutaneous pen injector 10/08/2020 inject 1 milliliter (120 mg) by subcutaneous route once a month in the abdomen, thigh, outer upper arm, or bottocks   ferrous sulfate 325 mg (65 mg iron) oral tablet  take 1 tablet (325 mg)  by oral route 2 times per day   Flonase Allergy Relief 50 mcg/actuation nasal spray,suspension 10/08/2020 inhale 2 puffs by nasal route daily for 30 days to each nostril   Loestrin Fe 1/20 (28-Day) 1 mg-20 mcg (21)/75 mg (7) oral tablet  take 1 tablet by oral route once daily   Nurtec ODT 75 mg oral tablet,disintegrating 06/11/2020 take 1 tablet (75 mg) and place on top of tongue, allow to dissolve then swallow by oral route once as needed for migraine; max 1 dose/24 hrs   Synthroid 25 mcg oral tablet 10/08/2020 take 1 tablet (25 mcg) by oral route once daily on an empty stomach 30 minutes before breakfast   Zofran 4 mg oral tablet 09/03/2020 take 1 tablet by oral route Q8H PRN nausea         Allergy List  Allergen Name Date Reaction Notes   NO KNOWN DRUG ALLERGIES --  --  --          Family Medical History  Disease Name Relative/Age Notes   Hypothyroidism  --    Heart Disease Grandfather (paternal)/   --    Hypertension Father/  Grandfather (maternal)/  Mother/   --    Diabetes, unspecified type Grandfather (maternal)/  Grandmother (maternal)/  Mother/   Mother; Grandmother (maternal)  Grandfather (maternal)   Prostate cancer Grandfather (paternal)/   Grandfather (paternal)   Renal Calculus Mother/   Mother   Family history of colon cancer Grandfather (paternal)/70s   Grandfather (paternal)/70s         Social History  Finding Status Start/Stop Quantity Notes   Alcohol Never 0/0 --  drinks no   Caffeine Current some day 0/0 --  drinks occasionally; tea and soft drinks; 1-2 times per day  drinks occasionally; tea; 1-2 times per day   Second hand smoke exposure Never 0/0 --  no   Tobacco Never --/-- --  --          Immunizations  NameDate Admin Mfg Trade Name Lot Number Route Inj VIS Given VIS Publication   InfluenzaRefused 10/08/2020 NE Not Entered  NE NE     Comments: Pt declined         Review of Systems  · Constitutional  o Denies  o : fatigue, fever, weight gain, weight loss,  "chills  · Cardiovascular  o Denies  o : chest Pain, palpitations, edema (swelling)  · Respiratory  o Denies  o : frequent cough, shortness of breath  · Gastrointestinal  o Denies  o : nausea, vomiting, changes in bowel habits  · Genitourinary  o Denies  o : dysuria, urinary frequency, urinary urgency, polyuria  · Integument  o Admits  o : rash  · Neurologic  o Admits  o : headache  o Denies  o : tingling or numbness, dizziness  · Musculoskeletal  o Denies  o : joint pain, myalgias  · Endocrine  o Denies  o : polydipsia, polyphagia  · Psychiatric  o Admits  o : anxiety  o Denies  o : depression, difficulty sleeping, mood changes, memory changes, SI/HI  · Allergic-Immunologic  o Admits  o : seasonal allergies  o Denies  o : eczema, urticaria      Vitals  Date Time BP Position Site L\R Cuff Size HR RR TEMP (F) WT  HT  BMI kg/m2 BSA m2 O2 Sat FR L/min FiO2 HC       06/11/2020 11:10 /85 Sitting    91 - R 18 97.6 144lbs 4oz 5'  5\" 24 1.73 99 %      10/08/2020 03:29 /77 Sitting    76 - R 12 97.5 148lbs 8oz 5'  5\" 24.71 1.76 99 %      04/12/2021 03:34 /89 Sitting    88 - R 18 98.5 155lbs 16oz 5'  5\" 25.96 1.8 98 %            Physical Examination  · Constitutional  o Appearance  o : well-nourished, in no acute distress  · Eyes  o Conjunctivae  o : conjunctivae normal  o Sclerae  o : sclerae white  o Pupils and Irises  o : pupils equal and round  o Eyelids/Ocular Adnexae  o : eyelid appearance normal, no exudates present  · Neck  o Inspection/Palpation  o : normal appearance, no masses or tenderness, trachea midline  o Range of Motion  o : cervical range of motion within normal limits  o Thyroid  o : gland size normal, nontender, no nodules or masses present on palpation  · Respiratory  o Respiratory Effort  o : breathing unlabored  o Inspection of Chest  o : normal appearance  o Auscultation of Lungs  o : normal breath sounds throughout inspiration and expiration  · Cardiovascular  o Heart  o : "   § Auscultation of Heart  § : regular rate and rhythm, no murmurs, gallops or rubs  o Peripheral Vascular System  o :   § Carotid Arteries  § : normal pulses bilaterally, no bruits present  · Gastrointestinal  o Abdominal Examination  o : abdomen nontender to palpation, tone normal without rigidity or guarding, no masses present, bowel sounds present  · Skin and Subcutaneous Tissue  o General Inspection  o : no rashes or lesions present, no areas of discoloration  o Body Hair  o : hair normal for age, general body hair distribution normal for age  o Digits and Nails  o : no clubbing, cyanosis, deformities or edema present, normal appearing nails  · Neurologic  o Mental Status Examination  o :   § Orientation  § : grossly oriented to person, place and time  o Gait and Station  o : normal gait, able to stand without difficulty  · Psychiatric  o Judgement and Insight  o : judgment and insight intact  o Mood and Affect  o : mood normal, affect appropriate          Assessment  · Allergic rhinitis due to allergen     477.9/J30.9  · Anxiety disorder     300.00/F41.9  · Depression     311/F32.9  · Hypothyroidism     244.9/E03.9  · Migraine headache     346.90/G43.909  · Urticaria     708.9/L50.9      Plan  · Orders  o Thyroid Profile (10695, 39523, THYII) - 244.9/E03.9 - 04/12/2021  o ACO-39: Current medications updated and reviewed (1159F, ) - - 04/12/2021  o Shellfish allergy panel (73827) - 708.9/L50.9 - 04/12/2021  · Medications  o nystatin-triamcinolone 100,000-0.1 unit/g-% topical cream   SIG: apply to the affected area(s) by topical route 2 times per day in the morning and evening for 14 days   DISP: (1) Tube with 0 refills  Prescribed on 04/12/2021     o buspirone 5 mg oral tablet   SIG: take 1 tablet (5 mg) by oral route 2 times per day for 30 days   DISP: (60) Tablet with 1 refills  Prescribed on 04/12/2021     o Medications have been Reconciled  o Transition of Care or Provider  Policy  · Instructions  o Patient was given an SSRI/SSNRI medication and warned of possible side effects of the medication including potential for increased risk of suicidal thoughts and feelings. Patient was instructed that if they begin to exhibit any of these effects they will discontinue the medication immediately and contact our office or the ER ASAP.  o Patient was educated/instructed on their diagnosis, treatment and medications prior to discharge from the clinic today.  o Call the office with any concerns or questions.  · Disposition  o Return to Office in 1 month.            Electronically Signed by: ROBERTO CARLOS Carnes -Author on April 14, 2021 01:19:41 PM

## 2021-05-15 VITALS
OXYGEN SATURATION: 98 % | WEIGHT: 145 LBS | RESPIRATION RATE: 28 BRPM | TEMPERATURE: 98.1 F | HEART RATE: 79 BPM | DIASTOLIC BLOOD PRESSURE: 79 MMHG | HEIGHT: 65 IN | SYSTOLIC BLOOD PRESSURE: 125 MMHG | BODY MASS INDEX: 24.16 KG/M2

## 2021-05-15 VITALS
DIASTOLIC BLOOD PRESSURE: 90 MMHG | WEIGHT: 144 LBS | HEART RATE: 97 BPM | RESPIRATION RATE: 12 BRPM | BODY MASS INDEX: 23.99 KG/M2 | HEIGHT: 65 IN | OXYGEN SATURATION: 99 % | SYSTOLIC BLOOD PRESSURE: 138 MMHG

## 2021-05-15 VITALS
HEIGHT: 65 IN | OXYGEN SATURATION: 99 % | RESPIRATION RATE: 18 BRPM | TEMPERATURE: 97.6 F | BODY MASS INDEX: 24.03 KG/M2 | DIASTOLIC BLOOD PRESSURE: 85 MMHG | WEIGHT: 144.25 LBS | SYSTOLIC BLOOD PRESSURE: 127 MMHG | HEART RATE: 91 BPM

## 2021-05-15 VITALS
WEIGHT: 148.12 LBS | DIASTOLIC BLOOD PRESSURE: 84 MMHG | RESPIRATION RATE: 18 BRPM | HEIGHT: 65 IN | HEART RATE: 96 BPM | SYSTOLIC BLOOD PRESSURE: 140 MMHG | OXYGEN SATURATION: 99 % | BODY MASS INDEX: 24.68 KG/M2

## 2021-05-15 VITALS
TEMPERATURE: 98.5 F | HEART RATE: 73 BPM | OXYGEN SATURATION: 99 % | DIASTOLIC BLOOD PRESSURE: 75 MMHG | BODY MASS INDEX: 23.99 KG/M2 | WEIGHT: 144 LBS | RESPIRATION RATE: 18 BRPM | SYSTOLIC BLOOD PRESSURE: 113 MMHG | HEIGHT: 65 IN

## 2021-05-15 VITALS
OXYGEN SATURATION: 99 % | BODY MASS INDEX: 24.16 KG/M2 | SYSTOLIC BLOOD PRESSURE: 119 MMHG | WEIGHT: 145 LBS | HEIGHT: 65 IN | RESPIRATION RATE: 12 BRPM | DIASTOLIC BLOOD PRESSURE: 83 MMHG | HEART RATE: 91 BPM

## 2021-05-16 VITALS
BODY MASS INDEX: 22.66 KG/M2 | HEART RATE: 91 BPM | WEIGHT: 136 LBS | SYSTOLIC BLOOD PRESSURE: 125 MMHG | OXYGEN SATURATION: 97 % | HEIGHT: 65 IN | DIASTOLIC BLOOD PRESSURE: 75 MMHG | TEMPERATURE: 97.6 F

## 2021-05-16 VITALS
BODY MASS INDEX: 23.68 KG/M2 | OXYGEN SATURATION: 99 % | HEART RATE: 84 BPM | RESPIRATION RATE: 12 BRPM | TEMPERATURE: 98.2 F | SYSTOLIC BLOOD PRESSURE: 125 MMHG | DIASTOLIC BLOOD PRESSURE: 76 MMHG | HEIGHT: 65 IN | WEIGHT: 142.12 LBS

## 2021-05-16 VITALS
DIASTOLIC BLOOD PRESSURE: 83 MMHG | BODY MASS INDEX: 23.99 KG/M2 | SYSTOLIC BLOOD PRESSURE: 126 MMHG | TEMPERATURE: 98 F | OXYGEN SATURATION: 98 % | HEIGHT: 65 IN | WEIGHT: 144 LBS | RESPIRATION RATE: 32 BRPM | HEART RATE: 95 BPM

## 2021-05-16 VITALS
DIASTOLIC BLOOD PRESSURE: 81 MMHG | RESPIRATION RATE: 16 BRPM | BODY MASS INDEX: 22.33 KG/M2 | HEART RATE: 88 BPM | OXYGEN SATURATION: 99 % | HEIGHT: 65 IN | WEIGHT: 134 LBS | SYSTOLIC BLOOD PRESSURE: 119 MMHG

## 2021-05-16 VITALS
HEIGHT: 65 IN | RESPIRATION RATE: 21 BRPM | WEIGHT: 137 LBS | OXYGEN SATURATION: 97 % | DIASTOLIC BLOOD PRESSURE: 80 MMHG | HEART RATE: 83 BPM | BODY MASS INDEX: 22.82 KG/M2 | SYSTOLIC BLOOD PRESSURE: 121 MMHG | TEMPERATURE: 97.6 F

## 2021-05-16 VITALS
DIASTOLIC BLOOD PRESSURE: 95 MMHG | HEART RATE: 103 BPM | HEIGHT: 65 IN | TEMPERATURE: 97.6 F | BODY MASS INDEX: 23.66 KG/M2 | WEIGHT: 142 LBS | OXYGEN SATURATION: 97 % | SYSTOLIC BLOOD PRESSURE: 141 MMHG

## 2021-05-16 VITALS
OXYGEN SATURATION: 100 % | SYSTOLIC BLOOD PRESSURE: 133 MMHG | HEART RATE: 81 BPM | TEMPERATURE: 98.6 F | HEIGHT: 65 IN | RESPIRATION RATE: 23 BRPM | WEIGHT: 140 LBS | DIASTOLIC BLOOD PRESSURE: 84 MMHG | BODY MASS INDEX: 23.32 KG/M2

## 2021-05-16 VITALS
HEART RATE: 97 BPM | DIASTOLIC BLOOD PRESSURE: 67 MMHG | TEMPERATURE: 98.1 F | SYSTOLIC BLOOD PRESSURE: 129 MMHG | OXYGEN SATURATION: 97 % | RESPIRATION RATE: 22 BRPM | HEIGHT: 66 IN | BODY MASS INDEX: 20.97 KG/M2 | WEIGHT: 130.5 LBS

## 2021-06-05 NOTE — PROGRESS NOTES
Progress Note      Patient Name: Alicia Rodriguez   Patient ID: [id number]   Sex: Female   YOB: 1995    Primary Care Provider: Carline AZEVEDO   Referring Provider: [facility]    Visit Date: May 10, 2021    Provider: ROBERTO CARLOS Carnes   Location: [facility]   Location Address: [address] [place] [address]  [id number]   Location Phone: [phone]          Chief Complaint  · Acute Visit for IV Fluids      History Of Present Illness  Alicia Rodriguez is a 25 year old /White female who presents for evaluation and treatment of:      Acute Visit for IV Fluids  Pt c/o nausea and loss of control of her bladder.  Feeling very tired. pt reports very little urine output this morning. pt didn't eat or drink much this week.       Seen [facility] acute [facility]. Negative flu, covid and strep.       LMP- 5/5/21       Past Medical History  Disease Name Date Onset Notes   Allergic rhinitis, chronic --  --    Anxiety disorder 11/20/2017 --    Hypertension --  --    Hyperthyroidism --  --    Hypothyroidism 02/27/2020 --    Migraine headache 02/10/2020 --    Pap smear for cervical cancer screening 9/22/20 EPW         Past Surgical History  Procedure Name Date Notes   Appendectomy --  --    Cholecstectomy --  --    Tonsilectomy --  --          Medication List  Name Date Started Instructions   B-Complex oral tablet  take 1 tablet by oral route daily   Emgality Pen 120 mg/mL subcutaneous pen injector 10/08/2020 inject 1 milliliter (120 mg) by subcutaneous route once a month in the abdomen, thigh, outer upper arm, or bottocks   ferrous sulfate 325 mg (65 mg iron) oral tablet  take 1 tablet (325 mg) by oral route 2 times per day   Flonase Allergy Relief 50 mcg/actuation nasal spray,suspension 05/10/2021 inhale 2 puffs by nasal route daily for 30 days to each nostril   Loestrin Fe 1/20 (28-Day) 1 mg-20 mcg (21)/75 mg (7) oral tablet  take 1 tablet by oral route once daily    Nurtec ODT 75 mg oral tablet,disintegrating 05/10/2021 take 1 tablet (75 mg) and place on top of tongue, allow to dissolve then swallow by oral route once as needed for migraine; max 1 dose/24 hrs   nystatin-triamcinolone 100,000-0.1 unit/g-% topical cream 04/12/2021 apply to the affected area(s) by topical route 2 times per day in the morning and evening for 14 days   Synthroid 25 mcg oral tablet 04/19/2021 take 1 tablet (25 mcg) by oral route once daily on an empty stomach 30 minutes before breakfast   Zofran 4 mg oral tablet 09/03/2020 take 1 tablet by oral route Q8H PRN nausea   Zoloft 25 mg oral tablet 05/06/2021 take 1 tablet (25 mg) by oral route once daily at bedtime         Allergy List  Allergen Name Date Reaction Notes   NO KNOWN DRUG ALLERGIES --  --  --          Family Medical History  Disease Name Relative/Age Notes   Hypothyroidism  --    Heart Disease Grandfather (paternal)/   --    Hypertension Father/  Grandfather (maternal)/  Mother/   --    Diabetes, unspecified type Grandfather (maternal)/  Grandmother (maternal)/  Mother/   Mother; Grandmother (maternal)  Grandfather (maternal)   Prostate cancer Grandfather (paternal)/   Grandfather (paternal)   Renal Calculus Mother/   Mother   Family history of colon cancer Grandfather (paternal)/70s   Grandfather (paternal)/70s         Social History  Finding Status Start/Stop Quantity Notes   Alcohol Never 0/0 --  drinks no   Caffeine Current some day 0/0 --  drinks occasionally; tea and soft drinks; 1-2 times per day  drinks occasionally; tea; 1-2 times per day   Second hand smoke exposure Never 0/0 --  no   Tobacco Never --/-- --  --          Immunizations  NameDate Admin Mfg Trade Name Lot Number Route Inj VIS Given VIS Publication   InfluenzaRefused 10/08/2020 NE Not Entered  NE NE     Comments: Pt declined         Review of Systems  · Constitutional  o Admits  o : fatigue  o Denies  o : fever, weight gain, weight loss, chills  · HENT  o Admits  o :  "postnasal drainage  o Denies  o : ear pain, sore throat  · Cardiovascular  o Denies  o : chest Pain, palpitations, edema (swelling)  · Respiratory  o Denies  o : frequent cough, shortness of breath  · Gastrointestinal  o Admits  o : nausea  o Denies  o : vomiting, changes in bowel habits  · Genitourinary  o Denies  o : dysuria, urinary frequency, urinary urgency, polyuria  · Neurologic  o Denies  o : headache, tingling or numbness, dizziness  · Musculoskeletal  o Denies  o : joint pain, myalgias  · Endocrine  o Denies  o : polydipsia, polyphagia  · Psychiatric  o Admits  o : anxiety  o Denies  o : mood changes, memory changes, SI/HI  · Allergic-Immunologic  o Admits  o : seasonal allergies  o Denies  o : eczema, urticaria      Vitals  Date Time BP Position Site L\R Cuff Size HR RR TEMP (F) WT  HT  BMI kg/m2 BSA m2 O2 Sat FR L/min FiO2 HC       10/08/2020 03:29 /77 Sitting    76 - R 12 97.5 148lbs 8oz 5'  5\" 24.71 1.76 99 %      04/12/2021 03:34 /89 Sitting    88 - R 18 98.5 155lbs 16oz 5'  5\" 25.96 1.8 98 %      05/10/2021 09:25 /85 Sitting    88 - R 18 99.6 155lbs 0oz 5'  5\" 25.79 1.8 98 %            Physical Examination  · Constitutional  o Appearance  o : well-nourished, well developed, noted to be uncomfortable , normal body habitus  · Eyes  o Conjunctivae  o : conjunctivae normal  o Sclerae  o : sclerae white  o Pupils and Irises  o : pupils equal and round  o Eyelids/Ocular Adnexae  o : eyelid appearance normal, no exudates present  · Ears, Nose, Mouth and Throat  o Ears  o :   § External Ears  § : external auditory canal appearance within normal limits, no discharge present  § Otoscopic Examination  § : tympanic membrane appearance within normal limits bilaterally, cerumen not present  o Nose  o :   § External Nose  § : appearance normal  § Intranasal Exam  § : mucosa within normal limits, vestibules normal, no intranasal lesions present, septum midline, sinuses non tender to " palpation  § Nasopharynx  § : no discharge present  o Oral Cavity  o :   § Oral Mucosa  § : oral mucosa normal  § Lips  § : lip appearance normal  § Teeth  § : normal dentation for age  o Throat  o :   § Oropharynx  § : marked cobblestone appearance noted-right side, tonsils within normal limits  · Neck  o Inspection/Palpation  o : normal appearance, no masses or tenderness, trachea midline  o Range of Motion  o : cervical range of motion within normal limits  o Thyroid  o : gland size normal, nontender, no nodules or masses present on palpation  · Respiratory  o Respiratory Effort  o : breathing unlabored  o Inspection of Chest  o : normal appearance  o Auscultation of Lungs  o : normal breath sounds throughout inspiration and expiration  · Cardiovascular  o Heart  o :   § Auscultation of Heart  § : regular rate and rhythm, no murmurs, gallops or rubs  o Peripheral Vascular System  o :   § Extremities  § : no clubbing or edema  · Gastrointestinal  o Abdominal Examination  o : abdomen nontender to palpation, tone normal without rigidity or guarding, no masses present, bowel sounds present  · Skin and Subcutaneous Tissue  o General Inspection  o : no rashes or lesions present, no areas of discoloration  o Body Hair  o : hair normal for age, general body hair distribution normal for age  o Digits and Nails  o : no clubbing, cyanosis, deformities or edema present, normal appearing nails  · Neurologic  o Mental Status Examination  o :   § Orientation  § : grossly oriented to person, place and time  o Gait and Station  o : normal gait, able to stand without difficulty  · Psychiatric  o Judgement and Insight  o : judgment and insight intact  o Mood and Affect  o : mood normal, affect appropriate          Results  · In-Office Procedures  o Lab procedure  § IOP - Urinalysis without Microscopy (Clinitek) [facility] (33673)   § Color Ur: Yellow   § Clarity Ur: Clear   § Glucose Ur Ql Strip: Negative   § Bilirub Ur Ql Strip: Negative    § Ketones Ur Ql Strip: Trace   § Sp Gr Ur Qn: 1.025   § Hgb Ur Ql Strip: Trace-Lysed   § pH Ur-LsCnc: 5.5   § Prot Ur Ql Strip: Negative   § Urobilinogen Ur Strip-mCnc: 0.2 E.U./dL   § Nitrite Ur Ql Strip: Negative   § WBC Est Ur Ql Strip: Negative       Assessment  · Allergic rhinitis due to allergen     477.9/J30.9  · Fatigue     780.79/R53.83  · Nausea     787.02/R11.0  · Viral illness     079.99/B34.9  negative flu, strep and covid at [facility].   · Dehydration determined by examination     276.51/E86.0  push fluids      Plan  · Orders  o ACO-39: Current medications updated and reviewed (1159F, ) - - 05/10/2021  o IV fluid replacement (96101) - 787.02/R11.0, 780.79/R53.83, 079.99/B34.9, 276.51/E86.0 - 05/10/2021   #20 RAC D5NS 1 liter bolus infusion. pt tolerated well. removed bleeding controlled after infusion.    Iv infusion from 9:10-10:00.  · Medications  o Medrol (Trevor) 4 mg oral tablets,dose pack   SIG: take as directed for 6 days   DISP: (1) Package with 0 refills  Prescribed on 05/10/2021     o Flonase Allergy Relief 50 mcg/actuation nasal spray,suspension   SIG: inhale 2 puffs by nasal route daily for 30 days to each nostril   DISP: (1) Device with 5 refills  Refilled on 05/10/2021     o Medications have been Reconciled  o Transition of Care or Provider Policy  · Instructions  o Patient was educated/instructed on their diagnosis, treatment and medications prior to discharge from the clinic today.  o Call the office with any concerns or questions.  · Disposition  o Call or Return if symptoms worsen or persist.            Electronically Signed by: ROBERTO CARLOS Carnes -Author on May 10, 2021 10:22:57 AM

## 2021-06-16 RX ORDER — ELETRIPTAN HYDROBROMIDE 20 MG/1
20 TABLET, FILM COATED ORAL ONCE AS NEEDED
Qty: 12 TABLET | Refills: 1 | Status: SHIPPED | OUTPATIENT
Start: 2021-06-16 | End: 2021-08-27 | Stop reason: SDUPTHER

## 2021-06-16 RX ORDER — ELETRIPTAN HYDROBROMIDE 20 MG/1
20 TABLET, FILM COATED ORAL ONCE AS NEEDED
COMMUNITY
End: 2021-06-16 | Stop reason: SDUPTHER

## 2021-06-22 RX ORDER — BUSPIRONE HYDROCHLORIDE 5 MG/1
5 TABLET ORAL 2 TIMES DAILY
COMMUNITY
Start: 2021-04-12 | End: 2021-06-22 | Stop reason: SDUPTHER

## 2021-06-22 RX ORDER — BUSPIRONE HYDROCHLORIDE 5 MG/1
5 TABLET ORAL 2 TIMES DAILY
Qty: 60 TABLET | Refills: 1 | Status: SHIPPED | OUTPATIENT
Start: 2021-06-22 | End: 2021-09-16 | Stop reason: SDUPTHER

## 2021-06-22 NOTE — TELEPHONE ENCOUNTER
"Pt reports she stopped taking Zoloft d/t feeling like a bucket of ice was dropped on her head. Pt started back on Buspirone 5mg and will \"just deal with the hot flashes.\"  "

## 2021-07-01 ENCOUNTER — OFFICE VISIT (OUTPATIENT)
Dept: FAMILY MEDICINE CLINIC | Facility: CLINIC | Age: 26
End: 2021-07-01

## 2021-07-01 VITALS
SYSTOLIC BLOOD PRESSURE: 131 MMHG | TEMPERATURE: 98.4 F | OXYGEN SATURATION: 99 % | BODY MASS INDEX: 24.78 KG/M2 | HEART RATE: 84 BPM | DIASTOLIC BLOOD PRESSURE: 90 MMHG | HEIGHT: 66 IN | RESPIRATION RATE: 18 BRPM | WEIGHT: 154.2 LBS

## 2021-07-01 DIAGNOSIS — Z91.030 BEE STING ALLERGY: ICD-10-CM

## 2021-07-01 DIAGNOSIS — M25.562 CHRONIC PAIN OF LEFT KNEE: Primary | ICD-10-CM

## 2021-07-01 DIAGNOSIS — G89.29 CHRONIC PAIN OF LEFT KNEE: Primary | ICD-10-CM

## 2021-07-01 PROBLEM — F41.9 ANXIETY DISORDER: Status: ACTIVE | Noted: 2017-11-20

## 2021-07-01 PROBLEM — J30.9 ALLERGIC RHINITIS: Status: ACTIVE | Noted: 2021-07-01

## 2021-07-01 PROBLEM — J32.9 SINUSITIS: Status: ACTIVE | Noted: 2021-07-01

## 2021-07-01 PROBLEM — I10 HYPERTENSION: Status: ACTIVE | Noted: 2021-07-01

## 2021-07-01 PROBLEM — E05.90 HYPERTHYROIDISM: Status: ACTIVE | Noted: 2021-07-01

## 2021-07-01 PROBLEM — H91.93 HEARING LOSS OF BOTH EARS: Status: ACTIVE | Noted: 2021-07-01

## 2021-07-01 PROBLEM — Z12.4 PAP SMEAR FOR CERVICAL CANCER SCREENING: Status: ACTIVE | Noted: 2020-09-22

## 2021-07-01 PROBLEM — E03.9 HYPOTHYROIDISM: Status: ACTIVE | Noted: 2020-02-27

## 2021-07-01 PROBLEM — G43.909 MIGRAINE HEADACHE: Status: ACTIVE | Noted: 2020-02-10

## 2021-07-01 PROCEDURE — 99213 OFFICE O/P EST LOW 20 MIN: CPT | Performed by: NURSE PRACTITIONER

## 2021-07-01 RX ORDER — SERTRALINE HYDROCHLORIDE 25 MG/1
25 TABLET, FILM COATED ORAL DAILY
COMMUNITY
End: 2021-07-01

## 2021-07-01 RX ORDER — GALCANEZUMAB 120 MG/ML
INJECTION, SOLUTION SUBCUTANEOUS
COMMUNITY
Start: 2021-06-25 | End: 2022-03-23

## 2021-07-01 RX ORDER — TRIAMCINOLONE ACETONIDE 1 MG/G
CREAM TOPICAL
COMMUNITY
Start: 2021-04-10 | End: 2022-03-23

## 2021-07-01 RX ORDER — VITAMIN B COMPLEX
CAPSULE ORAL DAILY
COMMUNITY
End: 2022-03-23

## 2021-07-01 RX ORDER — FERROUS SULFATE 325(65) MG
325 TABLET ORAL 2 TIMES DAILY
COMMUNITY
End: 2021-07-01

## 2021-07-01 RX ORDER — EPINEPHRINE 0.3 MG/.3ML
0.3 INJECTION SUBCUTANEOUS ONCE
Qty: 1 EACH | Refills: 1 | Status: SHIPPED | OUTPATIENT
Start: 2021-07-01 | End: 2021-07-01

## 2021-07-01 NOTE — PROGRESS NOTES
Chief Complaint  Knee Pain and Insect Bite (swelling on wrist, stung by either hornet or wasp)    Subjective          Alicia Rodriguez is a 25 y.o. female who presents to [facility]  History of Present Illness    Knee pain - ongoing for 1 year. Pt plays softball and volleyball and when she slide it hurt. Pt can go up and down stairs. Pain worse with not moving. Pt has not taken any otc meds.     Insect bite - bee sting to left forearm, had local reaction and streaking to elbow.     PHQ-2 Total Score: 0   PHQ-9 Total Score: 0       Review of Systems   Constitutional: Negative for chills, fatigue and fever.   Respiratory: Negative for cough and shortness of breath.    Cardiovascular: Negative for chest pain and palpitations.   Gastrointestinal: Negative for constipation, diarrhea, nausea and vomiting.   Musculoskeletal: Negative for back pain and neck pain.        Knee pain   Skin: Positive for rash.   Neurological: Negative for dizziness and headaches.          Medical History: has a past medical history of Anxiety disorder (11/20/2017), Chronic allergic rhinitis, Hypertension, Hypothyroidism (02/27/2020), and Migraine headache (02/10/2020).     Surgical History: has a past surgical history that includes Appendectomy; Cholecystectomy; and Tonsillectomy.     Family History: family history includes Colon cancer in her paternal grandfather; Diabetes in her maternal grandfather, maternal grandmother, and mother; Heart disease in her paternal grandfather; Hypertension in her father, maternal grandfather, and mother; Hypothyroidism in an other family member; Kidney nephrosis in her mother; Prostate cancer in her paternal grandfather.     Social History: reports that she has never smoked. She has never used smokeless tobacco. Drug use questions deferred to the physician. She reports that she does not drink alcohol.    Allergies: Patient has no known allergies.      Health Maintenance Due    Topic Date Due   • ANNUAL PHYSICAL  Never done   • COVID-19 Vaccine (1) Never done   • TDAP/TD VACCINES (2 - Td or Tdap) 10/03/2016   • HEPATITIS C SCREENING  Never done   • PAP SMEAR  Never done            Current Outpatient Medications:   •  B Complex Vitamins (vitamin b complex) capsule capsule, Take  by mouth Daily., Disp: , Rfl:   •  Emgality 120 MG/ML auto-injector pen, INJECT 1 ML SUBCUTANEOUSLY IN THE ABDOMEN, THIGH, UPPER ARM, OR BUTTOCKS EVERY MONTH, Disp: , Rfl:   •  fluticasone (FLONASE) 50 MCG/ACT nasal spray, 2 sprays into the nostril(s) as directed by provider Daily., Disp: , Rfl:   •  levothyroxine (Synthroid) 25 MCG tablet, Take 1 tablet by mouth Daily., Disp: , Rfl:   •  ondansetron (ZOFRAN) 4 MG tablet, Take 4 mg by mouth Every 8 (Eight) Hours As Needed., Disp: , Rfl:   •  busPIRone (BUSPAR) 5 MG tablet, Take 1 tablet by mouth 2 (Two) Times a Day., Disp: 60 tablet, Rfl: 1  •  eletriptan (RELPAX) 20 MG tablet, Take 1 tablet by mouth 1 (One) Time As Needed for Migraine. may repeat in 2 hours if necessary, Disp: 12 tablet, Rfl: 1  •  EPINEPHrine (EpiPen 2-Trevor) 0.3 MG/0.3ML solution auto-injector injection, Inject 0.3 mL into the appropriate muscle as directed by prescriber 1 (One) Time for 1 dose., Disp: 1 each, Rfl: 1  •  triamcinolone (KENALOG) 0.1 % cream, APPLY TO AFFECTED AREA TWICE A DAY, Disp: , Rfl:       Immunization History   Administered Date(s) Administered   • DTaP 02/02/1996, 04/02/1996, 06/04/1996, 06/17/1997, 03/12/2001   • Flu Vaccine Quad PF >18YRS 10/17/2013   • Flu Vaccine Quad PF >36MO 11/20/2017   • HPV Quadrivalent 11/13/2008, 06/22/2009, 10/26/2009   • Hep B, Adolescent or Pediatric 1995, 02/02/1996, 06/11/1996   • Hepatitis A 04/21/2018   • Hib (PRP-OMP) 02/02/1996, 04/02/1996, 06/11/1996   • IPV 03/12/2001   • Influenza LAIV (Nasal) 11/13/2008, 12/09/2010   • Influenza TIV (IM) 10/26/2009   • MMR 03/12/1997, 03/12/2001   • Meningococcal MCV4P (Menactra) 10/23/2007  "  • OPV 02/02/1996, 04/02/1996, 06/11/1996   • Tdap 10/03/2006   • Varicella 11/26/1996, 10/03/2006         Objective       Vitals:    07/01/21 1148   BP: 131/90   Pulse: 84   Resp: 18   Temp: 98.4 °F (36.9 °C)   TempSrc: Temporal   SpO2: 99%   Weight: 69.9 kg (154 lb 3.2 oz)   Height: 167.6 cm (66\")   PainSc:   2      Body mass index is 24.89 kg/m².   Wt Readings from Last 3 Encounters:   07/01/21 69.9 kg (154 lb 3.2 oz)   05/10/21 70.3 kg (155 lb)   04/12/21 70.8 kg (156 lb)      BP Readings from Last 3 Encounters:   07/01/21 131/90   05/10/21 126/85   04/12/21 135/89        Physical Exam  Vitals reviewed.   Constitutional:       Appearance: Normal appearance. She is well-developed.   HENT:      Head: Normocephalic and atraumatic.   Eyes:      Conjunctiva/sclera: Conjunctivae normal.      Pupils: Pupils are equal, round, and reactive to light.   Cardiovascular:      Rate and Rhythm: Normal rate and regular rhythm.      Heart sounds: Normal heart sounds. No murmur heard.     Pulmonary:      Effort: Pulmonary effort is normal.      Breath sounds: Normal breath sounds. No wheezing or rhonchi.   Abdominal:      General: Bowel sounds are normal. There is no distension.      Palpations: Abdomen is soft.      Tenderness: There is no abdominal tenderness.   Musculoskeletal:      Left knee: No swelling, effusion, erythema or bony tenderness. Normal range of motion. Tenderness present over the lateral joint line. Normal alignment, normal meniscus and normal patellar mobility.   Skin:     General: Skin is warm and dry.      Findings: Rash present. Rash is urticarial.      Comments: Urticarial rash with minimal streak on left forearm. Local reaction near wrist.    Neurological:      Mental Status: She is alert and oriented to person, place, and time.   Psychiatric:         Mood and Affect: Mood and affect normal.         Behavior: Behavior normal.         Thought Content: Thought content normal.         Judgment: Judgment " normal.             Result Review :     CMP    CMP 10/9/20   Glucose 60 (A)   BUN 9   Creatinine 0.78   Sodium 139   Potassium 3.8   Chloride 105   Calcium 9.1   Albumin 4.1   Total Bilirubin 0.35   Alkaline Phosphatase 81   AST (SGOT) 18   ALT (SGPT) 12   (A) Abnormal value                       Assessment and Plan        Diagnoses and all orders for this visit:    1. Chronic pain of left knee (Primary)  -     Ambulatory Referral to Physical Therapy Evaluate and treat    2. Bee sting allergy  -     EPINEPHrine (EpiPen 2-Trevor) 0.3 MG/0.3ML solution auto-injector injection; Inject 0.3 mL into the appropriate muscle as directed by prescriber 1 (One) Time for 1 dose.  Dispense: 1 each; Refill: 1          Follow Up     Return if symptoms worsen or fail to improve.    Patient was given instructions and counseling regarding her condition or for health maintenance advice. Please see specific information pulled into the AVS if appropriate.     ROBERTO CARLOS Carnes

## 2021-07-01 NOTE — PATIENT INSTRUCTIONS
Acute Knee Pain, Adult  Many things can cause knee pain. Sometimes, knee pain is sudden (acute) and may be caused by damage, swelling, or irritation of the muscles and tissues that support your knee.  The pain often goes away on its own with time and rest. If the pain does not go away, tests may be done to find out what is causing the pain.  Follow these instructions at home:  Pay attention to any changes in your symptoms. Take these actions to relieve your pain.  If you have a knee sleeve or brace:    · Wear the sleeve or brace as told by your doctor. Remove it only as told by your doctor.  · Loosen the sleeve or brace if your toes:  ? Tingle.  ? Become numb.  ? Turn cold and blue.  · Keep the sleeve or brace clean.  · If the sleeve or brace is not waterproof:  ? Do not let it get wet.  ? Cover it with a watertight covering when you take a bath or shower.  Activity  · Rest your knee.  · Do not do things that cause pain.  · Avoid activities where both feet leave the ground at the same time (high-impact activities). Examples are running, jumping rope, and doing jumping jacks.  · Work with a physical therapist to make a safe exercise program, as told by your doctor.  Managing pain, stiffness, and swelling    · If told, put ice on the knee:  ? Put ice in a plastic bag.  ? Place a towel between your skin and the bag.  ? Leave the ice on for 20 minutes, 2-3 times a day.  · If told, put pressure (compression) on your injured knee to control swelling, give support, and help with discomfort. Compression may be done with an elastic bandage.  General instructions  · Take all medicines only as told by your doctor.  · Raise (elevate) your knee while you are sitting or lying down. Make sure your knee is higher than your heart.  · Sleep with a pillow under your knee.  · Do not use any products that contain nicotine or tobacco. These include cigarettes, e-cigarettes, and chewing tobacco. These products may slow down healing. If  you need help quitting, ask your doctor.  · If you are overweight, work with your doctor and a food expert (dietitian) to set goals to lose weight. Being overweight can make your knee hurt more.  · Keep all follow-up visits as told by your doctor. This is important.  Contact a doctor if:  · The knee pain does not stop.  · The knee pain changes or gets worse.  · You have a fever along with knee pain.  · Your knee feels warm when you touch it.  · Your knee gives out or locks up.  Get help right away if:  · Your knee swells, and the swelling gets worse.  · You cannot move your knee.  · You have very bad knee pain.  Summary  · Many things can cause knee pain. The pain often goes away on its own with time and rest.  · Your doctor may do tests to find out the cause of the pain.  · Pay attention to any changes in your symptoms. Relieve your pain with rest, medicines, light activity, and use of ice.  · Get help right away if you cannot move your knee or your knee pain is very bad.  This information is not intended to replace advice given to you by your health care provider. Make sure you discuss any questions you have with your health care provider.  Document Revised: 05/30/2019 Document Reviewed: 05/30/2019  [employer] Patient Education © 2021 [employer]

## 2021-07-15 VITALS
DIASTOLIC BLOOD PRESSURE: 85 MMHG | SYSTOLIC BLOOD PRESSURE: 126 MMHG | TEMPERATURE: 99.6 F | WEIGHT: 155 LBS | HEART RATE: 88 BPM | RESPIRATION RATE: 18 BRPM | BODY MASS INDEX: 25.83 KG/M2 | HEIGHT: 65 IN | OXYGEN SATURATION: 98 %

## 2021-08-27 ENCOUNTER — TELEPHONE (OUTPATIENT)
Dept: FAMILY MEDICINE CLINIC | Facility: CLINIC | Age: 26
End: 2021-08-27

## 2021-08-27 RX ORDER — ELETRIPTAN HYDROBROMIDE 20 MG/1
20 TABLET, FILM COATED ORAL ONCE AS NEEDED
Qty: 12 TABLET | Refills: 1 | Status: SHIPPED | OUTPATIENT
Start: 2021-08-27 | End: 2022-02-25 | Stop reason: SDUPTHER

## 2021-08-27 RX ORDER — ONDANSETRON 4 MG/1
4 TABLET, FILM COATED ORAL EVERY 8 HOURS PRN
Qty: 10 TABLET | Refills: 0 | Status: SHIPPED | OUTPATIENT
Start: 2021-08-27

## 2021-08-27 NOTE — TELEPHONE ENCOUNTER
Caller: Alicia Rodriguez    Relationship: Self    Best call back number:[phone]    What is the best time to reach you: ANYTIME    Who are you requesting to speak with (clinical staff, provider,  specific staff member): CELESTINO      What was the call regarding: PATIENT WOULD NOT SAY. STATED THAT SHE WAS TOLD IF SHE EVER NEEDED ANYTHING SHE COULD JUST CALL AND ASK FOR THEM. PATIENT STATED THEY WILL KNOW.                Do you require a callback: YES

## 2021-09-16 ENCOUNTER — OFFICE VISIT (OUTPATIENT)
Dept: FAMILY MEDICINE CLINIC | Facility: CLINIC | Age: 26
End: 2021-09-16

## 2021-09-16 VITALS
RESPIRATION RATE: 12 BRPM | HEART RATE: 71 BPM | TEMPERATURE: 98.1 F | WEIGHT: 154.2 LBS | HEIGHT: 66 IN | OXYGEN SATURATION: 98 % | BODY MASS INDEX: 24.78 KG/M2 | DIASTOLIC BLOOD PRESSURE: 83 MMHG | SYSTOLIC BLOOD PRESSURE: 127 MMHG

## 2021-09-16 DIAGNOSIS — G43.009 MIGRAINE WITHOUT AURA AND WITHOUT STATUS MIGRAINOSUS, NOT INTRACTABLE: Primary | ICD-10-CM

## 2021-09-16 DIAGNOSIS — F41.1 GENERALIZED ANXIETY DISORDER: ICD-10-CM

## 2021-09-16 DIAGNOSIS — J30.1 SEASONAL ALLERGIC RHINITIS DUE TO POLLEN: ICD-10-CM

## 2021-09-16 DIAGNOSIS — E03.8 HYPOTHYROIDISM DUE TO HASHIMOTO'S THYROIDITIS: ICD-10-CM

## 2021-09-16 DIAGNOSIS — E06.3 HYPOTHYROIDISM DUE TO HASHIMOTO'S THYROIDITIS: ICD-10-CM

## 2021-09-16 PROCEDURE — 99214 OFFICE O/P EST MOD 30 MIN: CPT | Performed by: NURSE PRACTITIONER

## 2021-09-16 RX ORDER — FLUTICASONE PROPIONATE 50 MCG
2 SPRAY, SUSPENSION (ML) NASAL DAILY
Qty: 48 G | Refills: 3 | Status: SHIPPED | OUTPATIENT
Start: 2021-09-16 | End: 2022-07-29

## 2021-09-16 RX ORDER — RIMEGEPANT SULFATE 75 MG/75MG
1 TABLET, ORALLY DISINTEGRATING ORAL DAILY PRN
Qty: 8 TABLET | Refills: 2 | Status: SHIPPED | OUTPATIENT
Start: 2021-09-16 | End: 2022-02-25

## 2021-09-16 RX ORDER — BUSPIRONE HYDROCHLORIDE 5 MG/1
5 TABLET ORAL 2 TIMES DAILY
Qty: 180 TABLET | Refills: 1 | Status: SHIPPED | OUTPATIENT
Start: 2021-09-16 | End: 2022-03-23 | Stop reason: SDUPTHER

## 2021-09-16 RX ORDER — LEVOTHYROXINE SODIUM 0.03 MG/1
25 TABLET ORAL DAILY
Qty: 90 TABLET | Refills: 1 | Status: SHIPPED | OUTPATIENT
Start: 2021-09-16 | End: 2022-03-23 | Stop reason: SDUPTHER

## 2021-09-16 RX ORDER — EPINEPHRINE 0.3 MG/.3ML
INJECTION SUBCUTANEOUS
COMMUNITY
Start: 2021-07-01

## 2021-09-16 NOTE — PROGRESS NOTES
Chief Complaint  Follow-up, Hypothyroidism, Hypertension, Migraine, and Med Refill    Subjective          Alicia Rodriguez is a 25 y.o. female who presents to [facility]    History of Present Illness    HTN - well controlled. Pt reports wnl when at home.    Migraines - Pt reports having 2 in the last month, lasting 1 day and the other was 2 hours. Pt reports she takes relpax with relief. Pt has failed fioricet, imitrex, maxalt.  Nurtec worked the best for pt, however insurance would not cover. Pt continue aimovig.     Hypothyroid - pt reports not taking medication.     Anxiety - well controlled with medications.     PHQ-2 Total Score:     PHQ-9 Total Score:         Review of Systems   Constitutional: Negative for chills, fatigue and fever.   Respiratory: Negative for cough and shortness of breath.    Cardiovascular: Negative for chest pain and palpitations.   Gastrointestinal: Negative for constipation, diarrhea, nausea and vomiting.   Musculoskeletal: Negative for back pain and neck pain.   Skin: Negative for rash.   Neurological: Positive for headaches. Negative for dizziness.   Psychiatric/Behavioral: Positive for sleep disturbance. Negative for suicidal ideas. The patient is nervous/anxious.           Medical History: has a past medical history of Anxiety disorder (11/20/2017), Chronic allergic rhinitis, Hypertension, Hypothyroidism (02/27/2020), and Migraine headache (02/10/2020).     Surgical History: has a past surgical history that includes Appendectomy; Cholecystectomy; and Tonsillectomy.     Family History: family history includes Colon cancer in her paternal grandfather; Diabetes in her maternal grandfather, maternal grandmother, and mother; Heart disease in her paternal grandfather; Hypertension in her father, maternal grandfather, and mother; Hypothyroidism in an other family member; Kidney nephrosis in her mother; Prostate cancer in her paternal grandfather.     Social  History: reports that she has never smoked. She has never used smokeless tobacco. Drug use questions deferred to the physician. She reports that she does not drink alcohol.    Allergies: Patient has no known allergies.      Health Maintenance Due   Topic Date Due   • ANNUAL PHYSICAL  Never done   • COVID-19 Vaccine (1) Never done   • TDAP/TD VACCINES (2 - Td or Tdap) 10/03/2016   • HEPATITIS C SCREENING  Never done   • PAP SMEAR  Never done            Current Outpatient Medications:   •  B Complex Vitamins (vitamin b complex) capsule capsule, Take  by mouth Daily., Disp: , Rfl:   •  busPIRone (BUSPAR) 5 MG tablet, Take 1 tablet by mouth 2 (Two) Times a Day., Disp: 180 tablet, Rfl: 1  •  eletriptan (RELPAX) 20 MG tablet, Take 1 tablet by mouth 1 (One) Time As Needed for Migraine. may repeat in 2 hours if necessary, Disp: 12 tablet, Rfl: 1  •  Emgality 120 MG/ML auto-injector pen, INJECT 1 ML SUBCUTANEOUSLY IN THE ABDOMEN, THIGH, UPPER ARM, OR BUTTOCKS EVERY MONTH, Disp: , Rfl:   •  EPINEPHrine (EPIPEN) 0.3 MG/0.3ML solution auto-injector injection, INJECT 0.3ML INTO THE APPROPRIATE MUSCLE AS DIRECTED FOR ONE DOSE, Disp: , Rfl:   •  fluticasone (FLONASE) 50 MCG/ACT nasal spray, 2 sprays into the nostril(s) as directed by provider Daily., Disp: 48 g, Rfl: 3  •  levothyroxine (Synthroid) 25 MCG tablet, Take 1 tablet by mouth Daily., Disp: 90 tablet, Rfl: 1  •  ondansetron (ZOFRAN) 4 MG tablet, Take 1 tablet by mouth Every 8 (Eight) Hours As Needed for Nausea., Disp: 10 tablet, Rfl: 0  •  Rimegepant Sulfate (Nurtec) 75 MG tablet dispersible tablet, Take 1 tablet by mouth Daily As Needed (migraine)., Disp: 8 tablet, Rfl: 2  •  triamcinolone (KENALOG) 0.1 % cream, APPLY TO AFFECTED AREA TWICE A DAY, Disp: , Rfl:       Immunization History   Administered Date(s) Administered   • DTaP 02/02/1996, 04/02/1996, 06/04/1996, 06/17/1997, 03/12/2001   • Flu Vaccine Quad PF >18YRS 10/17/2013   • Flu Vaccine Quad PF >36MO 11/20/2017  "  • HPV Quadrivalent 11/13/2008, 06/22/2009, 10/26/2009   • Hep B, Adolescent or Pediatric 1995, 02/02/1996, 06/11/1996   • Hepatitis A 04/21/2018   • Hib (PRP-OMP) 02/02/1996, 04/02/1996, 06/11/1996   • IPV 03/12/2001   • Influenza LAIV (Nasal) 11/13/2008, 12/09/2010   • Influenza TIV (IM) 10/26/2009   • MMR 03/12/1997, 03/12/2001   • Meningococcal MCV4P (Menactra) 10/23/2007   • OPV 02/02/1996, 04/02/1996, 06/11/1996   • Tdap 10/03/2006   • Varicella 11/26/1996, 10/03/2006         Objective       Vitals:    09/16/21 1125   BP: 127/83   Pulse: 71   Resp: 12   Temp: 98.1 °F (36.7 °C)   TempSrc: Temporal   SpO2: 98%   Weight: 69.9 kg (154 lb 3.2 oz)   Height: 167.6 cm (66\")   PainSc: 0-No pain      Body mass index is 24.89 kg/m².   Wt Readings from Last 3 Encounters:   09/16/21 69.9 kg (154 lb 3.2 oz)   07/01/21 69.9 kg (154 lb 3.2 oz)   05/10/21 70.3 kg (155 lb)      BP Readings from Last 3 Encounters:   09/16/21 127/83   07/01/21 131/90   05/10/21 126/85        Physical Exam  Vitals reviewed.   Constitutional:       Appearance: Normal appearance. She is well-developed.   HENT:      Head: Normocephalic and atraumatic.   Eyes:      Conjunctiva/sclera: Conjunctivae normal.      Pupils: Pupils are equal, round, and reactive to light.   Cardiovascular:      Rate and Rhythm: Normal rate and regular rhythm.      Heart sounds: Normal heart sounds. No murmur heard.     Pulmonary:      Effort: Pulmonary effort is normal.      Breath sounds: Normal breath sounds. No wheezing or rhonchi.   Abdominal:      General: Bowel sounds are normal. There is no distension.      Palpations: Abdomen is soft.      Tenderness: There is no abdominal tenderness.   Skin:     General: Skin is warm and dry.   Neurological:      Mental Status: She is alert and oriented to person, place, and time.   Psychiatric:         Mood and Affect: Mood and affect normal.         Behavior: Behavior normal.         Thought Content: Thought content normal. "         Judgment: Judgment normal.             Result Review :     TSH    TSH 10/9/20   TSH 2.380                      Assessment and Plan        Diagnoses and all orders for this visit:    1. Migraine without aura and without status migrainosus, not intractable (Primary)  -     Rimegepant Sulfate (Nurtec) 75 MG tablet dispersible tablet; Take 1 tablet by mouth Daily As Needed (migraine).  Dispense: 8 tablet; Refill: 2    2. Hypothyroidism due to Hashimoto's thyroiditis  -     levothyroxine (Synthroid) 25 MCG tablet; Take 1 tablet by mouth Daily.  Dispense: 90 tablet; Refill: 1    3. Generalized anxiety disorder  -     busPIRone (BUSPAR) 5 MG tablet; Take 1 tablet by mouth 2 (Two) Times a Day.  Dispense: 180 tablet; Refill: 1    4. Seasonal allergic rhinitis due to pollen  -     fluticasone (FLONASE) 50 MCG/ACT nasal spray; 2 sprays into the nostril(s) as directed by provider Daily.  Dispense: 48 g; Refill: 3          Follow Up     Return in about 6 months (around 3/16/2022) for Next scheduled follow up.    Patient was given instructions and counseling regarding her condition or for health maintenance advice. Please see specific information pulled into the AVS if appropriate.     ROBERTO CARLOS Carnes

## 2022-02-17 ENCOUNTER — TELEPHONE (OUTPATIENT)
Dept: FAMILY MEDICINE CLINIC | Facility: CLINIC | Age: 27
End: 2022-02-17

## 2022-02-17 NOTE — TELEPHONE ENCOUNTER
Caller: Alicia Rodriguez    Relationship: Self    Best call back number:  [phone]    What is the best time to reach you: ANYTIME    Who are you requesting to speak with (clinical staff, provider,  specific staff member): NURSE     What was the call regarding: PATIENT HAS SOME QUESTIONS REGARDING THE COVID SHOTS AND WOULD LIKE TO DISCUSS WITH SOMEONE.     Do you require a callback: YES

## 2022-02-25 ENCOUNTER — OFFICE VISIT (OUTPATIENT)
Dept: OBSTETRICS AND GYNECOLOGY | Facility: CLINIC | Age: 27
End: 2022-02-25

## 2022-02-25 VITALS
HEART RATE: 90 BPM | HEIGHT: 66 IN | BODY MASS INDEX: 24.59 KG/M2 | SYSTOLIC BLOOD PRESSURE: 136 MMHG | DIASTOLIC BLOOD PRESSURE: 85 MMHG | WEIGHT: 153 LBS

## 2022-02-25 DIAGNOSIS — Z01.419 WELL WOMAN EXAM: Primary | ICD-10-CM

## 2022-02-25 PROCEDURE — 99395 PREV VISIT EST AGE 18-39: CPT | Performed by: NURSE PRACTITIONER

## 2022-02-25 PROCEDURE — G0123 SCREEN CERV/VAG THIN LAYER: HCPCS | Performed by: NURSE PRACTITIONER

## 2022-02-25 RX ORDER — ELETRIPTAN HYDROBROMIDE 20 MG/1
20 TABLET, FILM COATED ORAL ONCE AS NEEDED
Qty: 12 TABLET | Refills: 1 | Status: SHIPPED | OUTPATIENT
Start: 2022-02-25 | End: 2022-03-23

## 2022-02-25 NOTE — TELEPHONE ENCOUNTER
Spoke to pt - her employer has mandated covid vaccine. Pt is nervous about getting it. I told pt that she does not have a reason for medical exemption and pt said that she does not have a [facility] reason. I reassured her that covid vaccine is recommended. Pt stated that she will come in today for vaccine.

## 2022-02-25 NOTE — PROGRESS NOTES
"  HPI:   26 y.o..Presents for well woman exam. Contraception or HRT: Contraception:  None  Menses:   q MONTH  lasts 4-5 days, changes products q 4hrs on heaviest days.   Pain:  None  Last pap normal   Complaints: Pt has no complaints today.  Patient reports that she is not currently experiencing any symptoms of urinary incontinence.      Past Medical History:   Diagnosis Date   • Anxiety disorder 2017   • Chronic allergic rhinitis    • Hypertension    • Hypothyroidism 2020   • Migraine headache 02/10/2020      Past Surgical History:   Procedure Laterality Date   • APPENDECTOMY     • CHOLECYSTECTOMY     • TONSILLECTOMY        Family History   Problem Relation Age of Onset   • Hypertension Mother    • Diabetes Mother         Unspecified type   • Kidney nephrosis Mother    • Hypertension Father    • Diabetes Maternal Grandmother         Unspecified type   • Hypertension Maternal Grandfather    • Diabetes Maternal Grandfather         Unspecified type   • Heart disease Paternal Grandfather    • Prostate cancer Paternal Grandfather    • Colon cancer Paternal Grandfather         70s   • Hypothyroidism Other         PCP: does manage PMHx and preventative labs      PHYSICAL EXAM: Chaperone present /85   Pulse 90   Ht 167.6 cm (66\")   Wt 69.4 kg (153 lb)   LMP 02/10/2022   Breastfeeding No   BMI 24.69 kg/m²   General- NAD, alert and oriented, appropriate  Psych- Normal mood, good memory  Neck- No masses, no thyroid enlargement  Lymphatic- No palpable neck, axillary, or groin nodes  CV- Regular rhythm, no murmurs  Resp- CTA to bases, no wheezes  Abdomen- Soft, non distended, non tender, no masses  Breast left-  Bilaterally symmetrical, no masses, non tender, no nipple discharge  Breast right- Bilaterally symmetrical, no masses, non tender, no nipple discharge  External genitalia- Normal female, no lesions  Urethra/meatus- Normal, no masses, non tender, no prolapse  Bladder- Normal, no masses, non " tender, no prolapse  Vagina- Normal, no atrophy, no lesions, no discharge, no prolapse  Cvx- Normal, no lesions, no discharge, No cervical motion tenderness  Uterus- Normal size, shape & consistency.  Non tender, mobile, & no prolapse  Adnexa- No mass, non tender  Anus/Rectum/Perineum- Not performed  Ext- No edema, no cyanosis    Skin- No lesions, no rashes, no acanthosis nigricans      ASSESSMENT and PLAN:    Diagnoses and all orders for this visit:    1. Well woman exam (Primary)  -     IGP,rfx Aptima HPV All Pth    Counseling:     All BC Options R/B/A/SE/E of each reviewed  DECLINES CONTRACEPTION    Preventative:   BREAST HEALTH- Monthly self breast exam importance and how to reviewed. MMG and/or MRI (prn) reviewed per society guidelines and her individual history. Screen: Updated today.  CERVICAL CANCER Screening- Reviewed current ASCCP guidelines for screening w and wo cotest HPV, age specific.  Screen: Updated today.  SEXUAL HEALTH: STD screening recommended.  She declines.  She understands risks of STDs NLT infection (herself and current/future partners), infertility, chronic pain, potential future surgery/complications,  Safe sex and condoms.  Follow up PCP/Specialist PMHx and Labs  [facility]: Does not qualify.  s/p FLU vaccine this season    She understands the importance of having any ordered tests to be performed in a timely fashion.  The risks of not performing them include, but are not limited to, advanced cancer stages, bone loss from osteoporosis and/or subsequent increase in morbidity and/or mortality.  She is encouraged to review her results online and/or contact or office if she has questions.     Follow Up:  Return for AS NEEDED.        Elisa Reed, APRN  02/25/2022

## 2022-03-03 ENCOUNTER — CLINICAL SUPPORT (OUTPATIENT)
Dept: FAMILY MEDICINE CLINIC | Facility: CLINIC | Age: 27
End: 2022-03-03

## 2022-03-03 DIAGNOSIS — Z23 NEED FOR COVID-19 VACCINE: Primary | ICD-10-CM

## 2022-03-03 LAB
CONV .: NORMAL
CYTOLOGIST CVX/VAG CYTO: NORMAL
CYTOLOGY CVX/VAG DOC CYTO: NORMAL
CYTOLOGY CVX/VAG DOC THIN PREP: NORMAL
DX ICD CODE: NORMAL
HIV 1 & 2 AB SER-IMP: NORMAL
Lab: NORMAL
OTHER STN SPEC: NORMAL
STAT OF ADQ CVX/VAG CYTO-IMP: NORMAL

## 2022-03-03 PROCEDURE — 91305 COVID-19 (PFIZER) 12+ YRS: CPT | Performed by: NURSE PRACTITIONER

## 2022-03-03 PROCEDURE — 0051A COVID-19 (PFIZER) 12+ YRS: CPT | Performed by: NURSE PRACTITIONER

## 2022-03-23 ENCOUNTER — OFFICE VISIT (OUTPATIENT)
Dept: FAMILY MEDICINE CLINIC | Facility: CLINIC | Age: 27
End: 2022-03-23

## 2022-03-23 VITALS
HEIGHT: 66 IN | DIASTOLIC BLOOD PRESSURE: 73 MMHG | SYSTOLIC BLOOD PRESSURE: 124 MMHG | TEMPERATURE: 97.6 F | BODY MASS INDEX: 23.95 KG/M2 | WEIGHT: 149 LBS | OXYGEN SATURATION: 97 % | HEART RATE: 85 BPM

## 2022-03-23 DIAGNOSIS — Z13.220 LIPID SCREENING: ICD-10-CM

## 2022-03-23 DIAGNOSIS — G43.009 MIGRAINE WITHOUT AURA AND WITHOUT STATUS MIGRAINOSUS, NOT INTRACTABLE: ICD-10-CM

## 2022-03-23 DIAGNOSIS — F41.1 GENERALIZED ANXIETY DISORDER: ICD-10-CM

## 2022-03-23 DIAGNOSIS — E03.8 HYPOTHYROIDISM DUE TO HASHIMOTO'S THYROIDITIS: Primary | ICD-10-CM

## 2022-03-23 DIAGNOSIS — E06.3 HYPOTHYROIDISM DUE TO HASHIMOTO'S THYROIDITIS: Primary | ICD-10-CM

## 2022-03-23 DIAGNOSIS — Z01.89 ENCOUNTER FOR ROUTINE LABORATORY TESTING: ICD-10-CM

## 2022-03-23 DIAGNOSIS — Z13.29 SCREENING FOR THYROID DISORDER: ICD-10-CM

## 2022-03-23 PROBLEM — I10 HYPERTENSION: Status: RESOLVED | Noted: 2021-07-01 | Resolved: 2022-03-23

## 2022-03-23 PROCEDURE — 99214 OFFICE O/P EST MOD 30 MIN: CPT | Performed by: NURSE PRACTITIONER

## 2022-03-23 RX ORDER — BUSPIRONE HYDROCHLORIDE 5 MG/1
5 TABLET ORAL 2 TIMES DAILY
Qty: 180 TABLET | Refills: 1 | Status: SHIPPED | OUTPATIENT
Start: 2022-03-23 | End: 2022-09-21

## 2022-03-23 RX ORDER — RIMEGEPANT SULFATE 75 MG/75MG
1 TABLET, ORALLY DISINTEGRATING ORAL AS NEEDED
COMMUNITY
Start: 2022-02-25 | End: 2022-03-23 | Stop reason: SDUPTHER

## 2022-03-23 RX ORDER — LEVOTHYROXINE SODIUM 0.03 MG/1
25 TABLET ORAL DAILY
Qty: 90 TABLET | Refills: 1 | Status: SHIPPED | OUTPATIENT
Start: 2022-03-23 | End: 2022-08-22

## 2022-03-23 RX ORDER — RIMEGEPANT SULFATE 75 MG/75MG
1 TABLET, ORALLY DISINTEGRATING ORAL AS NEEDED
Qty: 8 TABLET | Refills: 2 | Status: SHIPPED | OUTPATIENT
Start: 2022-03-23 | End: 2023-03-20

## 2022-03-23 NOTE — PROGRESS NOTES
Chief Complaint  Follow-up (6 month ) and Hypothyroidism    Subjective          Alicia Rodriguez is a 26 y.o. female who presents to [facility]    History of Present Illness    Hypothyroid - pt reports compliance with meds. Pt reports energy level is good.     Anxiety - well controlled.     Migraines - Pt has not taken aimgality since august d/t insurance issues. Pt reports having 3 migraines since August. Pt has taken Nurtec with relief.       PHQ-2 Total Score:     PHQ-9 Total Score:         Review of Systems   Constitutional: Negative for chills, fatigue and fever.   Respiratory: Negative for cough and shortness of breath.    Cardiovascular: Negative for chest pain and palpitations.   Gastrointestinal: Negative for constipation, diarrhea, nausea and vomiting.   Musculoskeletal: Negative for back pain and neck pain.   Skin: Negative for rash.   Neurological: Positive for headaches. Negative for dizziness.          Medical History: has a past medical history of Anxiety disorder (11/20/2017), Chronic allergic rhinitis, Hypertension, Hypothyroidism (02/27/2020), and Migraine headache (02/10/2020).     Surgical History: has a past surgical history that includes Appendectomy; Cholecystectomy; and Tonsillectomy.     Family History: family history includes Colon cancer in her paternal grandfather; Diabetes in her maternal grandfather, maternal grandmother, and mother; Heart disease in her paternal grandfather; Hypertension in her father, maternal grandfather, and mother; Hypothyroidism in an other family member; Kidney nephrosis in her mother; Prostate cancer in her paternal grandfather.     Social History: reports that she has never smoked. She has never used smokeless tobacco. She reports current alcohol use. Drug use questions deferred to the physician.    Allergies: Patient has no known allergies.      Health Maintenance Due   Topic Date Due   • ANNUAL PHYSICAL  Never done   • TDAP/TD  "VACCINES (2 - Td or Tdap) 10/03/2016   • HEPATITIS C SCREENING  Never done   • COVID-19 Vaccine (2 - Pfizer 3-dose series) 03/24/2022            Current Outpatient Medications:   •  busPIRone (BUSPAR) 5 MG tablet, Take 1 tablet by mouth 2 (Two) Times a Day., Disp: 180 tablet, Rfl: 1  •  EPINEPHrine (EPIPEN) 0.3 MG/0.3ML solution auto-injector injection, INJECT 0.3ML INTO THE APPROPRIATE MUSCLE AS DIRECTED FOR ONE DOSE, Disp: , Rfl:   •  fluticasone (FLONASE) 50 MCG/ACT nasal spray, 2 sprays into the nostril(s) as directed by provider Daily., Disp: 48 g, Rfl: 3  •  levothyroxine (Synthroid) 25 MCG tablet, Take 1 tablet by mouth Daily., Disp: 90 tablet, Rfl: 1  •  Nurtec 75 MG dispersible tablet, Take 1 tablet by mouth As Needed (migraine)., Disp: 8 tablet, Rfl: 2  •  ondansetron (ZOFRAN) 4 MG tablet, Take 1 tablet by mouth Every 8 (Eight) Hours As Needed for Nausea., Disp: 10 tablet, Rfl: 0      Immunization History   Administered Date(s) Administered   • Covid-19 (Pfizer) Gray Cap 03/03/2022   • DTaP 02/02/1996, 04/02/1996, 06/04/1996, 06/17/1997, 03/12/2001   • Flu Vaccine Quad PF >18YRS 10/17/2013   • Flu Vaccine Quad PF >36MO 11/20/2017   • HPV Quadrivalent 11/13/2008, 06/22/2009, 10/26/2009   • Hep B, Adolescent or Pediatric 1995, 02/02/1996, 06/11/1996   • Hepatitis A 04/21/2018   • Hib (PRP-OMP) 02/02/1996, 04/02/1996, 06/11/1996   • IPV 03/12/2001   • Influenza LAIV (Nasal) 11/13/2008, 12/09/2010   • Influenza TIV (IM) 10/26/2009   • MMR 03/12/1997, 03/12/2001   • Meningococcal MCV4P (Menactra) 10/23/2007   • OPV 02/02/1996, 04/02/1996, 06/11/1996   • Tdap 10/03/2006   • Varicella 11/26/1996, 10/03/2006         Objective       Vitals:    03/23/22 0721   BP: 124/73   BP Location: Left arm   Patient Position: Sitting   Pulse: 85   Temp: 97.6 °F (36.4 °C)   SpO2: 97%   Weight: 67.6 kg (149 lb)   Height: 167.6 cm (66\")      Body mass index is 24.05 kg/m².   Wt Readings from Last 3 Encounters:   03/23/22 " 67.6 kg (149 lb)   02/25/22 69.4 kg (153 lb)   09/16/21 69.9 kg (154 lb 3.2 oz)      BP Readings from Last 3 Encounters:   03/23/22 124/73   02/25/22 136/85   09/16/21 127/83        Physical Exam  Vitals reviewed.   Constitutional:       Appearance: Normal appearance. She is well-developed.   HENT:      Head: Normocephalic and atraumatic.   Eyes:      Conjunctiva/sclera: Conjunctivae normal.      Pupils: Pupils are equal, round, and reactive to light.   Cardiovascular:      Rate and Rhythm: Normal rate and regular rhythm.      Heart sounds: Normal heart sounds. No murmur heard.  Pulmonary:      Effort: Pulmonary effort is normal.      Breath sounds: Normal breath sounds. No wheezing or rhonchi.   Abdominal:      General: Bowel sounds are normal. There is no distension.      Palpations: Abdomen is soft.      Tenderness: There is no abdominal tenderness.   Skin:     General: Skin is warm and dry.   Neurological:      Mental Status: She is alert and oriented to person, place, and time.   Psychiatric:         Mood and Affect: Mood and affect normal.         Behavior: Behavior normal.         Thought Content: Thought content normal.         Judgment: Judgment normal.           Result Review :                Assessment and Plan        Diagnoses and all orders for this visit:    1. Hypothyroidism due to Hashimoto's thyroiditis (Primary)  -     levothyroxine (Synthroid) 25 MCG tablet; Take 1 tablet by mouth Daily.  Dispense: 90 tablet; Refill: 1    2. Migraine without aura and without status migrainosus, not intractable  -     Nurtec 75 MG dispersible tablet; Take 1 tablet by mouth As Needed (migraine).  Dispense: 8 tablet; Refill: 2    3. Generalized anxiety disorder  -     busPIRone (BUSPAR) 5 MG tablet; Take 1 tablet by mouth 2 (Two) Times a Day.  Dispense: 180 tablet; Refill: 1    4. Lipid screening  -     Lipid Panel; Future  -     Comprehensive Metabolic Panel; Future    5. Screening for thyroid disorder  -     TSH;  Future    6. Encounter for routine laboratory testing  -     CBC (No Diff); Future          Follow Up     Return in about 6 months (around 9/23/2022) for Next scheduled follow up.    Patient was given instructions and counseling regarding her condition or for health maintenance advice. Please see specific information pulled into the AVS if appropriate.     ROBERTO CARLOS Carnes

## 2022-04-08 ENCOUNTER — CLINICAL SUPPORT (OUTPATIENT)
Dept: FAMILY MEDICINE CLINIC | Facility: CLINIC | Age: 27
End: 2022-04-08

## 2022-04-08 DIAGNOSIS — Z23 NEED FOR COVID-19 VACCINE: Primary | ICD-10-CM

## 2022-04-08 PROCEDURE — 91305 COVID-19 (PFIZER) 12+ YRS: CPT | Performed by: NURSE PRACTITIONER

## 2022-04-08 PROCEDURE — 0002A COVID-19 (PFIZER) 12+ YRS: CPT | Performed by: NURSE PRACTITIONER

## 2022-04-26 ENCOUNTER — TELEPHONE (OUTPATIENT)
Dept: FAMILY MEDICINE CLINIC | Facility: CLINIC | Age: 27
End: 2022-04-26

## 2022-04-26 RX ORDER — GALCANEZUMAB 120 MG/ML
120 INJECTION, SOLUTION SUBCUTANEOUS
Qty: 1 ML | Refills: 5 | Status: SHIPPED | OUTPATIENT
Start: 2022-04-26 | End: 2022-07-18

## 2022-04-26 NOTE — TELEPHONE ENCOUNTER
Patient called stating she believes she found a loop hole in her insurance and found a way to get her emgaility injection and is wondering if we can prescribe it to her once again?

## 2022-05-19 ENCOUNTER — OFFICE VISIT (OUTPATIENT)
Dept: FAMILY MEDICINE CLINIC | Facility: CLINIC | Age: 27
End: 2022-05-19

## 2022-05-19 VITALS
WEIGHT: 150.8 LBS | SYSTOLIC BLOOD PRESSURE: 132 MMHG | TEMPERATURE: 98.4 F | DIASTOLIC BLOOD PRESSURE: 90 MMHG | BODY MASS INDEX: 24.23 KG/M2 | OXYGEN SATURATION: 98 % | HEART RATE: 94 BPM | HEIGHT: 66 IN

## 2022-05-19 DIAGNOSIS — F90.2 ATTENTION DEFICIT HYPERACTIVITY DISORDER (ADHD), COMBINED TYPE: Primary | ICD-10-CM

## 2022-05-19 DIAGNOSIS — Z30.09 UNWANTED FERTILITY: ICD-10-CM

## 2022-05-19 PROCEDURE — 99213 OFFICE O/P EST LOW 20 MIN: CPT | Performed by: NURSE PRACTITIONER

## 2022-05-19 RX ORDER — RIMEGEPANT SULFATE 75 MG/75MG
75 TABLET, ORALLY DISINTEGRATING ORAL
COMMUNITY
Start: 2022-03-23 | End: 2022-07-18

## 2022-05-19 RX ORDER — VILOXAZINE HYDROCHLORIDE 200 MG/1
1 CAPSULE, EXTENDED RELEASE ORAL DAILY
Qty: 30 CAPSULE | Refills: 1 | Status: SHIPPED | OUTPATIENT
Start: 2022-05-19 | End: 2022-06-20 | Stop reason: SDUPTHER

## 2022-05-19 NOTE — PROGRESS NOTES
Chief Complaint  Anxiety    Subjective          Alicia Rodriguez is a 26 y.o. female who presents to [facility]    History of Present Illness    Migraine - pt reports today is first one since restarted her Aimovig. Pt admits to light sensitivity. Denies n/v.     Anxiety - pt reports her anxiety is back and Buspar is not helping. Pt has difficulty with focus and remembering her task that she needs to complete. Pt denies tasks at home that are not finished.     Pt feels like her brain never stops.   Pt is positive for multiple questions on kids gisella screening tool.     Pt request referral to gyn for unwanted fertility.       Review of Systems   Constitutional: Negative for chills, fatigue and fever.   Respiratory: Negative for cough and shortness of breath.    Cardiovascular: Negative for chest pain and palpitations.   Gastrointestinal: Negative for constipation, diarrhea, nausea and vomiting.   Musculoskeletal: Negative for back pain and neck pain.   Skin: Negative for rash.   Neurological: Negative for dizziness and headaches.   Psychiatric/Behavioral: Positive for agitation and decreased concentration. Negative for sleep disturbance and suicidal ideas. The patient is nervous/anxious.           Medical History: has a past medical history of Anxiety disorder (11/20/2017), Chronic allergic rhinitis, Hypertension, Hypothyroidism (02/27/2020), and Migraine headache (02/10/2020).     Surgical History: has a past surgical history that includes Appendectomy; Cholecystectomy; and Tonsillectomy.     Family History: family history includes Colon cancer in her paternal grandfather; Diabetes in her maternal grandfather, maternal grandmother, and mother; Heart disease in her paternal grandfather; Hypertension in her father, maternal grandfather, and mother; Hypothyroidism in an other family member; Kidney nephrosis in her mother; Prostate cancer in her paternal grandfather.     Social  History: reports that she has never smoked. She has never used smokeless tobacco. She reports current alcohol use. Drug use questions deferred to the physician.    Allergies: Patient has no known allergies.      Health Maintenance Due   Topic Date Due   • ANNUAL PHYSICAL  Never done   • HEPATITIS C SCREENING  Never done            Current Outpatient Medications:   •  busPIRone (BUSPAR) 5 MG tablet, Take 1 tablet by mouth 2 (Two) Times a Day., Disp: 180 tablet, Rfl: 1  •  EPINEPHrine (EPIPEN) 0.3 MG/0.3ML solution auto-injector injection, INJECT 0.3ML INTO THE APPROPRIATE MUSCLE AS DIRECTED FOR ONE DOSE, Disp: , Rfl:   •  fluticasone (FLONASE) 50 MCG/ACT nasal spray, 2 sprays into the nostril(s) as directed by provider Daily., Disp: 48 g, Rfl: 3  •  galcanezumab-gnlm (Emgality) 120 MG/ML auto-injector pen, Inject 1 mL under the skin into the appropriate area as directed Every 30 (Thirty) Days for 180 days., Disp: 1 mL, Rfl: 5  •  levothyroxine (Synthroid) 25 MCG tablet, Take 1 tablet by mouth Daily., Disp: 90 tablet, Rfl: 1  •  Nurtec 75 MG dispersible tablet, Take 1 tablet by mouth As Needed (migraine)., Disp: 8 tablet, Rfl: 2  •  ondansetron (ZOFRAN) 4 MG tablet, Take 1 tablet by mouth Every 8 (Eight) Hours As Needed for Nausea., Disp: 10 tablet, Rfl: 0  •  Rimegepant Sulfate (Nurtec) 75 MG tablet dispersible tablet, Take 75 mg by mouth., Disp: , Rfl:   •  Viloxazine HCl ER (Qelbree) 200 MG capsule sustained-release 24 hr, Take 1 capsule by mouth Daily., Disp: 30 capsule, Rfl: 1      Immunization History   Administered Date(s) Administered   • Covid-19 (Pfizer) Gray Cap 03/03/2022, 04/08/2022   • DTaP 02/02/1996, 04/02/1996, 06/04/1996, 06/17/1997, 03/12/2001   • Flu Vaccine Quad PF >36MO 11/20/2017   • Fluzone Split Quad (Multi-dose) 10/17/2013   • HPV Quadrivalent 11/13/2008, 06/22/2009, 10/26/2009   • Hep B, Adolescent or Pediatric 1995, 02/02/1996, 06/11/1996   • Hepatitis A 04/21/2018   • Hib (PRP-OMP)  "02/02/1996, 04/02/1996, 06/11/1996   • IPV 03/12/2001   • Influenza LAIV (Nasal) 11/13/2008, 12/09/2010   • Influenza TIV (IM) 10/26/2009   • MMR 03/12/1997, 03/12/2001   • Meningococcal MCV4P (Menactra) 10/23/2007   • OPV 02/02/1996, 04/02/1996, 06/11/1996   • Tdap 10/03/2006, 11/29/2016   • Varicella 11/26/1996, 10/03/2006          Objective       Vitals:    05/19/22 1151   BP: 132/90   BP Location: Right arm   Patient Position: Sitting   Pulse: 94   Temp: 98.4 °F (36.9 °C)   SpO2: 98%   Weight: 68.4 kg (150 lb 12.8 oz)   Height: 167.6 cm (66\")      Body mass index is 24.34 kg/m².   Wt Readings from Last 3 Encounters:   05/19/22 68.4 kg (150 lb 12.8 oz)   03/23/22 67.6 kg (149 lb)   02/25/22 69.4 kg (153 lb)      BP Readings from Last 3 Encounters:   05/19/22 132/90   03/23/22 124/73   02/25/22 136/85        Physical Exam  Vitals reviewed.   Constitutional:       Appearance: Normal appearance. She is well-developed.   HENT:      Head: Normocephalic and atraumatic.   Eyes:      Conjunctiva/sclera: Conjunctivae normal.      Pupils: Pupils are equal, round, and reactive to light.   Cardiovascular:      Rate and Rhythm: Normal rate and regular rhythm.      Heart sounds: Normal heart sounds. No murmur heard.  Pulmonary:      Effort: Pulmonary effort is normal.      Breath sounds: Normal breath sounds. No wheezing or rhonchi.   Abdominal:      General: Bowel sounds are normal. There is no distension.      Palpations: Abdomen is soft.      Tenderness: There is no abdominal tenderness.   Skin:     General: Skin is warm and dry.   Neurological:      Mental Status: She is alert and oriented to person, place, and time.   Psychiatric:         Mood and Affect: Mood and affect normal.         Behavior: Behavior normal.         Thought Content: Thought content normal.         Judgment: Judgment normal.             Result Review :                  Assessment and Plan        Diagnoses and all orders for this visit:    1. Attention " deficit hyperactivity disorder (ADHD), combined type (Primary)  -     Viloxazine HCl ER (Qelbree) 200 MG capsule sustained-release 24 hr; Take 1 capsule by mouth Daily.  Dispense: 30 capsule; Refill: 1    2. Unwanted fertility  -     Ambulatory Referral to Gynecology      Continue to work on executive functioning skills or time management and organization.     Follow Up     Return in about 4 weeks (around 6/16/2022) for Next scheduled follow up.    Patient was given instructions and counseling regarding her condition or for health maintenance advice. Please see specific information pulled into the AVS if appropriate.     ROBERTO CARLOS Carnes

## 2022-05-24 ENCOUNTER — TELEPHONE (OUTPATIENT)
Dept: FAMILY MEDICINE CLINIC | Facility: CLINIC | Age: 27
End: 2022-05-24

## 2022-05-24 NOTE — TELEPHONE ENCOUNTER
Caller: [facility]    Relationship: Other    Best call back number: [phone]  OTT:  [id number]    What is the best time to reach you: ANYTIME    Who are you requesting to speak with (clinical staff, provider,  specific staff member): CLINICAL      What was the call regarding: [facility] FROM [facility] STATED THE PRIOR AUTHORIZATION WAS APPROVED FOR Viloxazine HCl ER (Qelbree) 200 MG capsule sustained-release 24 hr BUT THE MEDICATION WAS PICKED UP AT THE PHARMACY THROUGH A DISCOUNT CARD. [facility] WANTED TO VERIFY IF PCP WANTS TO PURSUE PRIOR AUTHORIZATION AFTER FIRST MONTH AND A CALL BACK TO CONFIRM      Do you require a callback: YES

## 2022-06-10 DIAGNOSIS — G43.009 MIGRAINE WITHOUT AURA AND WITHOUT STATUS MIGRAINOSUS, NOT INTRACTABLE: ICD-10-CM

## 2022-06-13 RX ORDER — RIMEGEPANT SULFATE 75 MG/75MG
TABLET, ORALLY DISINTEGRATING ORAL
Qty: 8 TABLET | Refills: 2 | OUTPATIENT
Start: 2022-06-13

## 2022-06-20 ENCOUNTER — LAB (OUTPATIENT)
Dept: LAB | Facility: HOSPITAL | Age: 27
End: 2022-06-20

## 2022-06-20 ENCOUNTER — OFFICE VISIT (OUTPATIENT)
Dept: FAMILY MEDICINE CLINIC | Facility: CLINIC | Age: 27
End: 2022-06-20

## 2022-06-20 VITALS
BODY MASS INDEX: 23.99 KG/M2 | SYSTOLIC BLOOD PRESSURE: 135 MMHG | WEIGHT: 144 LBS | HEART RATE: 88 BPM | DIASTOLIC BLOOD PRESSURE: 95 MMHG | OXYGEN SATURATION: 99 % | HEIGHT: 65 IN | TEMPERATURE: 98.6 F

## 2022-06-20 DIAGNOSIS — Z13.29 SCREENING FOR THYROID DISORDER: ICD-10-CM

## 2022-06-20 DIAGNOSIS — Z01.89 ENCOUNTER FOR ROUTINE LABORATORY TESTING: ICD-10-CM

## 2022-06-20 DIAGNOSIS — I10 PRIMARY HYPERTENSION: ICD-10-CM

## 2022-06-20 DIAGNOSIS — Z13.220 LIPID SCREENING: ICD-10-CM

## 2022-06-20 DIAGNOSIS — F90.2 ATTENTION DEFICIT HYPERACTIVITY DISORDER (ADHD), COMBINED TYPE: Primary | ICD-10-CM

## 2022-06-20 LAB
ALBUMIN SERPL-MCNC: 4.7 G/DL (ref 3.5–5.2)
ALBUMIN/GLOB SERPL: 1.9 G/DL
ALP SERPL-CCNC: 60 U/L (ref 39–117)
ALT SERPL W P-5'-P-CCNC: 8 U/L (ref 1–33)
ANION GAP SERPL CALCULATED.3IONS-SCNC: 12.2 MMOL/L (ref 5–15)
AST SERPL-CCNC: 14 U/L (ref 1–32)
BILIRUB SERPL-MCNC: 0.3 MG/DL (ref 0–1.2)
BUN SERPL-MCNC: 13 MG/DL (ref 6–20)
BUN/CREAT SERPL: 17.3 (ref 7–25)
CALCIUM SPEC-SCNC: 9.2 MG/DL (ref 8.6–10.5)
CHLORIDE SERPL-SCNC: 102 MMOL/L (ref 98–107)
CHOLEST SERPL-MCNC: 132 MG/DL (ref 0–200)
CO2 SERPL-SCNC: 23.8 MMOL/L (ref 22–29)
CREAT SERPL-MCNC: 0.75 MG/DL (ref 0.57–1)
DEPRECATED RDW RBC AUTO: 38.3 FL (ref 37–54)
EGFRCR SERPLBLD CKD-EPI 2021: 112.8 ML/MIN/1.73
ERYTHROCYTE [DISTWIDTH] IN BLOOD BY AUTOMATED COUNT: 11.9 % (ref 12.3–15.4)
GLOBULIN UR ELPH-MCNC: 2.5 GM/DL
GLUCOSE SERPL-MCNC: 91 MG/DL (ref 65–99)
HCT VFR BLD AUTO: 41.5 % (ref 34–46.6)
HDLC SERPL-MCNC: 39 MG/DL (ref 40–60)
HGB BLD-MCNC: 13.3 G/DL (ref 12–15.9)
LDLC SERPL CALC-MCNC: 73 MG/DL (ref 0–100)
LDLC/HDLC SERPL: 1.83 {RATIO}
MCH RBC QN AUTO: 28.3 PG (ref 26.6–33)
MCHC RBC AUTO-ENTMCNC: 32 G/DL (ref 31.5–35.7)
MCV RBC AUTO: 88.3 FL (ref 79–97)
PLATELET # BLD AUTO: 293 10*3/MM3 (ref 140–450)
PMV BLD AUTO: 9.5 FL (ref 6–12)
POTASSIUM SERPL-SCNC: 4.1 MMOL/L (ref 3.5–5.2)
PROT SERPL-MCNC: 7.2 G/DL (ref 6–8.5)
RBC # BLD AUTO: 4.7 10*6/MM3 (ref 3.77–5.28)
SODIUM SERPL-SCNC: 138 MMOL/L (ref 136–145)
TRIGL SERPL-MCNC: 109 MG/DL (ref 0–150)
TSH SERPL DL<=0.05 MIU/L-ACNC: 1.57 UIU/ML (ref 0.27–4.2)
VLDLC SERPL-MCNC: 20 MG/DL (ref 5–40)
WBC NRBC COR # BLD: 7.1 10*3/MM3 (ref 3.4–10.8)

## 2022-06-20 PROCEDURE — 80061 LIPID PANEL: CPT

## 2022-06-20 PROCEDURE — 99213 OFFICE O/P EST LOW 20 MIN: CPT | Performed by: NURSE PRACTITIONER

## 2022-06-20 PROCEDURE — 36415 COLL VENOUS BLD VENIPUNCTURE: CPT

## 2022-06-20 PROCEDURE — 80050 GENERAL HEALTH PANEL: CPT

## 2022-06-20 RX ORDER — HYDROCHLOROTHIAZIDE 12.5 MG/1
12.5 TABLET ORAL EVERY MORNING
Qty: 90 TABLET | Refills: 1 | Status: SHIPPED | OUTPATIENT
Start: 2022-06-20 | End: 2022-06-27 | Stop reason: SINTOL

## 2022-06-20 RX ORDER — VILOXAZINE HYDROCHLORIDE 200 MG/1
2 CAPSULE, EXTENDED RELEASE ORAL DAILY
Qty: 60 CAPSULE | Refills: 1 | Status: SHIPPED | OUTPATIENT
Start: 2022-06-20 | End: 2022-08-22

## 2022-06-20 NOTE — PROGRESS NOTES
Chief Complaint  ADHD (1M follow up, pt reports no new concerns, managing well with Rx.)    Subjective          Alicia Rodriguez is a 26 y.o. female who presents to [facility]    History of Present Illness    ADHD - Pt reports starting with Qelbree and jittery for 3-4 days then it resolved and appetite decreased. Pt reports her anxiety has improved. Focus is improved. Pt is sleeping 6-7 hours per night.     Blood pressure elevated - pt has hx of htn at age 18-21 and med was stopped at age 21 when pregnant. Pt was on propranolol previously.     Review of Systems   Constitutional: Positive for unexpected weight change.   Psychiatric/Behavioral: Negative for suicidal ideas. The patient is not nervous/anxious.           Medical History: has a past medical history of Anxiety disorder (11/20/2017), Chronic allergic rhinitis, Hypertension, Hypothyroidism (02/27/2020), and Migraine headache (02/10/2020).     Surgical History: has a past surgical history that includes Appendectomy; Cholecystectomy; and Tonsillectomy.     Family History: family history includes Colon cancer in her paternal grandfather; Diabetes in her maternal grandfather, maternal grandmother, and mother; Heart disease in her paternal grandfather; Hypertension in her father, maternal grandfather, and mother; Hypothyroidism in an other family member; Kidney nephrosis in her mother; Prostate cancer in her paternal grandfather.     Social History: reports that she has never smoked. She has never used smokeless tobacco. She reports current alcohol use. Drug use questions deferred to the physician.    Allergies: Patient has no known allergies.      Health Maintenance Due   Topic Date Due   • ANNUAL PHYSICAL  Never done   • HEPATITIS C SCREENING  Never done            Current Outpatient Medications:   •  busPIRone (BUSPAR) 5 MG tablet, Take 1 tablet by mouth 2 (Two) Times a Day., Disp: 180 tablet, Rfl: 1  •  EPINEPHrine (EPIPEN) 0.3  MG/0.3ML solution auto-injector injection, INJECT 0.3ML INTO THE APPROPRIATE MUSCLE AS DIRECTED FOR ONE DOSE, Disp: , Rfl:   •  fluticasone (FLONASE) 50 MCG/ACT nasal spray, 2 sprays into the nostril(s) as directed by provider Daily., Disp: 48 g, Rfl: 3  •  galcanezumab-gnlm (Emgality) 120 MG/ML auto-injector pen, Inject 1 mL under the skin into the appropriate area as directed Every 30 (Thirty) Days for 180 days., Disp: 1 mL, Rfl: 5  •  levothyroxine (Synthroid) 25 MCG tablet, Take 1 tablet by mouth Daily., Disp: 90 tablet, Rfl: 1  •  Nurtec 75 MG dispersible tablet, Take 1 tablet by mouth As Needed (migraine)., Disp: 8 tablet, Rfl: 2  •  ondansetron (ZOFRAN) 4 MG tablet, Take 1 tablet by mouth Every 8 (Eight) Hours As Needed for Nausea., Disp: 10 tablet, Rfl: 0  •  Viloxazine HCl ER (Qelbree) 200 MG capsule sustained-release 24 hr, Take 2 capsules by mouth Daily., Disp: 60 capsule, Rfl: 1  •  hydroCHLOROthiazide (HYDRODIURIL) 12.5 MG tablet, Take 1 tablet by mouth Every Morning., Disp: 90 tablet, Rfl: 1  •  Rimegepant Sulfate (Nurtec) 75 MG tablet dispersible tablet, Take 75 mg by mouth., Disp: , Rfl:       Immunization History   Administered Date(s) Administered   • Covid-19 (Pfizer) Gray Cap 03/03/2022, 04/08/2022   • DTaP 02/02/1996, 04/02/1996, 06/04/1996, 06/17/1997, 03/12/2001   • Flu Vaccine Quad PF >36MO 11/20/2017   • Fluzone Split Quad (Multi-dose) 10/17/2013   • HPV Quadrivalent 11/13/2008, 06/22/2009, 10/26/2009   • Hep B, Adolescent or Pediatric 1995, 02/02/1996, 06/11/1996   • Hepatitis A 04/21/2018   • Hib (PRP-OMP) 02/02/1996, 04/02/1996, 06/11/1996   • IPV 03/12/2001   • Influenza LAIV (Nasal) 11/13/2008, 12/09/2010   • Influenza TIV (IM) 10/26/2009   • MMR 03/12/1997, 03/12/2001   • Meningococcal MCV4P (Menactra) 10/23/2007   • OPV 02/02/1996, 04/02/1996, 06/11/1996   • Tdap 10/03/2006, 11/29/2016   • Varicella 11/26/1996, 10/03/2006         Objective       Vitals:    06/20/22 1522   BP:  "135/95   BP Location: Left arm   Patient Position: Sitting   Cuff Size: Adult   Pulse: 88   Temp: 98.6 °F (37 °C)   TempSrc: Temporal   SpO2: 99%   Weight: 65.3 kg (144 lb)   Height: 165.1 cm (65\")   PainSc: 0-No pain      Body mass index is 23.96 kg/m².   Wt Readings from Last 3 Encounters:   06/20/22 65.3 kg (144 lb)   05/19/22 68.4 kg (150 lb 12.8 oz)   03/23/22 67.6 kg (149 lb)      BP Readings from Last 3 Encounters:   06/20/22 135/95   05/19/22 132/90   03/23/22 124/73        Physical Exam  Vitals reviewed.   Constitutional:       Appearance: Normal appearance. She is well-developed.   HENT:      Head: Normocephalic and atraumatic.   Eyes:      Conjunctiva/sclera: Conjunctivae normal.      Pupils: Pupils are equal, round, and reactive to light.   Cardiovascular:      Rate and Rhythm: Normal rate and regular rhythm.      Heart sounds: Normal heart sounds. No murmur heard.  Pulmonary:      Effort: Pulmonary effort is normal.      Breath sounds: Normal breath sounds. No wheezing or rhonchi.   Abdominal:      General: Bowel sounds are normal. There is no distension.      Palpations: Abdomen is soft.      Tenderness: There is no abdominal tenderness.   Skin:     General: Skin is warm and dry.   Neurological:      Mental Status: She is alert and oriented to person, place, and time.   Psychiatric:         Mood and Affect: Mood and affect normal.         Behavior: Behavior normal.         Thought Content: Thought content normal.         Judgment: Judgment normal.             Result Review :                      Assessment and Plan        Diagnoses and all orders for this visit:    1. Attention deficit hyperactivity disorder (ADHD), combined type (Primary)  -     Viloxazine HCl ER (Qelbree) 200 MG capsule sustained-release 24 hr; Take 2 capsules by mouth Daily.  Dispense: 60 capsule; Refill: 1    2. Primary hypertension  -     hydroCHLOROthiazide (HYDRODIURIL) 12.5 MG tablet; Take 1 tablet by mouth Every Morning.  " Dispense: 90 tablet; Refill: 1          Follow Up     Return in about 4 weeks (around 7/18/2022) for Next scheduled follow up.    Patient was given instructions and counseling regarding her condition or for health maintenance advice. Please see specific information pulled into the AVS if appropriate.     ROBERTO CARLOS Carnes

## 2022-06-27 ENCOUNTER — TELEPHONE (OUTPATIENT)
Dept: FAMILY MEDICINE CLINIC | Facility: CLINIC | Age: 27
End: 2022-06-27

## 2022-06-27 RX ORDER — LISINOPRIL 5 MG/1
5 TABLET ORAL DAILY
Qty: 90 TABLET | Refills: 1 | Status: SHIPPED | OUTPATIENT
Start: 2022-06-27 | End: 2022-08-22

## 2022-06-27 NOTE — TELEPHONE ENCOUNTER
Caller: Alicia Rodriguez    Relationship: Self    Best call back number: [phone]    What medications are you currently taking:   Current Outpatient Medications on File Prior to Visit   Medication Sig Dispense Refill   • busPIRone (BUSPAR) 5 MG tablet Take 1 tablet by mouth 2 (Two) Times a Day. 180 tablet 1   • EPINEPHrine (EPIPEN) 0.3 MG/0.3ML solution auto-injector injection INJECT 0.3ML INTO THE APPROPRIATE MUSCLE AS DIRECTED FOR ONE DOSE     • fluticasone (FLONASE) 50 MCG/ACT nasal spray 2 sprays into the nostril(s) as directed by provider Daily. 48 g 3   • galcanezumab-gnlm (Emgality) 120 MG/ML auto-injector pen Inject 1 mL under the skin into the appropriate area as directed Every 30 (Thirty) Days for 180 days. 1 mL 5   • hydroCHLOROthiazide (HYDRODIURIL) 12.5 MG tablet Take 1 tablet by mouth Every Morning. 90 tablet 1   • levothyroxine (Synthroid) 25 MCG tablet Take 1 tablet by mouth Daily. 90 tablet 1   • Nurtec 75 MG dispersible tablet Take 1 tablet by mouth As Needed (migraine). 8 tablet 2   • ondansetron (ZOFRAN) 4 MG tablet Take 1 tablet by mouth Every 8 (Eight) Hours As Needed for Nausea. 10 tablet 0   • Rimegepant Sulfate (Nurtec) 75 MG tablet dispersible tablet Take 75 mg by mouth.     • Viloxazine HCl ER (Qelbree) 200 MG capsule sustained-release 24 hr Take 2 capsules by mouth Daily. 60 capsule 1     No current facility-administered medications on file prior to visit.          When did you start taking these medications: 06.20.2022    Which medication are you concerned about: hydroCHLOROthiazide (HYDRODIURIL) 12.5 MG tablet    Who prescribed you this medication: ROBERTO CARLOS GODOY    What are your concerns: CONSTIPATION, STOMACH PAIN, LOSS OF APPETITE, CAN'T GO EVEN WITH LAXATIVES      How long have you had these concerns: SINCE 06.24.2022

## 2022-07-18 ENCOUNTER — OFFICE VISIT (OUTPATIENT)
Dept: OBSTETRICS AND GYNECOLOGY | Facility: CLINIC | Age: 27
End: 2022-07-18

## 2022-07-18 ENCOUNTER — PREP FOR SURGERY (OUTPATIENT)
Dept: OTHER | Facility: HOSPITAL | Age: 27
End: 2022-07-18

## 2022-07-18 VITALS
BODY MASS INDEX: 23.8 KG/M2 | DIASTOLIC BLOOD PRESSURE: 108 MMHG | SYSTOLIC BLOOD PRESSURE: 154 MMHG | WEIGHT: 143 LBS | HEART RATE: 73 BPM

## 2022-07-18 DIAGNOSIS — Z30.09 UNWANTED FERTILITY: Primary | ICD-10-CM

## 2022-07-18 DIAGNOSIS — E06.3 HYPOTHYROIDISM DUE TO HASHIMOTO'S THYROIDITIS: ICD-10-CM

## 2022-07-18 DIAGNOSIS — I10 PRIMARY HYPERTENSION: ICD-10-CM

## 2022-07-18 DIAGNOSIS — E03.8 HYPOTHYROIDISM DUE TO HASHIMOTO'S THYROIDITIS: ICD-10-CM

## 2022-07-18 PROCEDURE — 99214 OFFICE O/P EST MOD 30 MIN: CPT | Performed by: OBSTETRICS & GYNECOLOGY

## 2022-07-18 RX ORDER — ONDANSETRON 2 MG/ML
4 INJECTION INTRAMUSCULAR; INTRAVENOUS EVERY 6 HOURS PRN
Status: CANCELLED | OUTPATIENT
Start: 2022-07-18

## 2022-07-18 RX ORDER — SODIUM CHLORIDE, SODIUM LACTATE, POTASSIUM CHLORIDE, CALCIUM CHLORIDE 600; 310; 30; 20 MG/100ML; MG/100ML; MG/100ML; MG/100ML
125 INJECTION, SOLUTION INTRAVENOUS CONTINUOUS
Status: CANCELLED | OUTPATIENT
Start: 2022-07-18

## 2022-07-18 RX ORDER — CEFAZOLIN SODIUM 2 G/100ML
2 INJECTION, SOLUTION INTRAVENOUS ONCE
Status: CANCELLED | OUTPATIENT
Start: 2022-07-18 | End: 2022-07-18

## 2022-07-18 NOTE — PROGRESS NOTES
GYN Visit    CC: Discuss tubal    HPI:   26 y.o. who presents to discuss permanent sterilization.  The patient reports that she has no desire for any future childbearing.  She is interested in proceeding with permanent sterilization.  She has no other concerns today.  She has no other questions today.  She reports that she was just recently diagnosed with hypertension.  She has not yet started her lisinopril.  She reports her most recent Pap smear is normal.    History: PMHx, Meds, Allergies, PSHx, Social Hx, and POBHx all reviewed and updated.    BP (!) 154/108   Pulse 73   Wt 64.9 kg (143 lb)   LMP 2022   BMI 23.80 kg/m²     Physical Exam  Vitals and nursing note reviewed. Exam conducted with a chaperone present.   Constitutional:       General: She is not in acute distress.     Appearance: Normal appearance. She is normal weight. She is not ill-appearing.   Abdominal:      General: Abdomen is flat. There is no distension.      Palpations: Abdomen is soft. There is no mass.      Tenderness: There is no abdominal tenderness. There is no guarding or rebound.      Hernia: No hernia is present.   Musculoskeletal:         General: No swelling.      Right lower leg: No edema.      Left lower leg: No edema.   Skin:     General: Skin is warm and dry.      Findings: No rash.   Neurological:      Mental Status: She is alert and oriented to person, place, and time.   Psychiatric:         Mood and Affect: Mood normal.         Behavior: Behavior normal.         Thought Content: Thought content normal.         Judgment: Judgment normal.       Pap on 6 2022 was normal  Labs on 2022 reviewed including CMP, TSH, lipid profile, and CBC    ASSESSMENT AND PLAN:  Diagnoses and all orders for this visit:    1. Unwanted fertility (Primary)  Overview:  Added automatically from request for surgery [id number]    Assessment & Plan:  We have discussed the risks, benefits, and alternatives to the procedure.  We  discussed the risks including the risk of infection, bleeding and hemorrhage, injury to nearby structure including, bowel, bladder, pelvic vasculature, pelvic nerves, ovaries, and the uterus, and other nearby structures.  We have discussed the risks of regret, risk of failure, and if failure occurred and increased risk for ectopic pregnancy.  We discussed the risk of menstrual changes, hormonal changes, and risk of pelvic pain post sterilization.  The patient has been offered alternative forms of birth control including: Oral contraceptives, NuvaRing, birth control patches, progesterone only birth control pills, Depo-Provera, all intrauterine contraceptives, partner vasectomy.  The patient expresses her understanding and wished to proceed with female permanent sterilization.  We have discussed the different methods of female sterilization including hysteroscopic and laparoscopic techniques.  With the laparoscopic techniques we have discussed occlusion of the tube with Filshie clips, Hulka clips, and Falope-Rings.  We have discussed cauterization of the fallopian tube, partial salpingectomy, and complete salpingectomy.  The patient wishes to proceed with complete salpingectomy      2. Primary hypertension  Assessment & Plan:  Reviewed the patient's blood pressures with her.  Recommend she start lisinopril ASAP.      3. Hypothyroidism due to Hashimoto's thyroiditis  Assessment & Plan:  Continue levothyroxine        Counseling: TRACK MENSES, RTO if <q21d, >7d long, heavy or painful.    All BIRTH CONTROL options R/B/A/SE/E of each reviewed in detail.  SAFE SEX/condoms importance reviewed.      Follow Up:  Return for post op.    Morgan Cheng MD  07/18/2022

## 2022-07-21 PROBLEM — Z12.4 PAP SMEAR FOR CERVICAL CANCER SCREENING: Status: RESOLVED | Noted: 2020-09-22 | Resolved: 2022-07-21

## 2022-07-21 NOTE — ASSESSMENT & PLAN NOTE
We have discussed the risks, benefits, and alternatives to the procedure.  We discussed the risks including the risk of infection, bleeding and hemorrhage, injury to nearby structure including, bowel, bladder, pelvic vasculature, pelvic nerves, ovaries, and the uterus, and other nearby structures.  We have discussed the risks of regret, risk of failure, and if failure occurred and increased risk for ectopic pregnancy.  We discussed the risk of menstrual changes, hormonal changes, and risk of pelvic pain post sterilization.  The patient has been offered alternative forms of birth control including: Oral contraceptives, NuvaRing, birth control patches, progesterone only birth control pills, Depo-Provera, all intrauterine contraceptives, partner vasectomy.  The patient expresses her understanding and wished to proceed with female permanent sterilization.  We have discussed the different methods of female sterilization including hysteroscopic and laparoscopic techniques.  With the laparoscopic techniques we have discussed occlusion of the tube with Filshie clips, Hulka clips, and Falope-Rings.  We have discussed cauterization of the fallopian tube, partial salpingectomy, and complete salpingectomy.  The patient wishes to proceed with complete salpingectomy    The patient declines any prescription contraception until her sterilization.  I have urged her to either practice abstinence or at the very least use condoms to avoid pregnancy until her sterilization.  I have urged her to have no sexual contact for at least 2 weeks prior to the sterilization.

## 2022-08-03 PROCEDURE — S0260 H&P FOR SURGERY: HCPCS | Performed by: OBSTETRICS & GYNECOLOGY

## 2022-08-03 NOTE — H&P
[facility]   HISTORY AND PHYSICAL    Patient Name: Alicia Rodriguez  : 1995  MRN: [id number]  Primary Care Physician:  Carline Calhoun APRN  Date of admission: 2022    Subjective   Subjective     Chief Complaint: Here for surgery    HPI:    Alicia Rodriguez is a 26 y.o. female who presents for permanent sterilization.  The patient has been extensively counseled regarding her options for contraception including all reversible, long-acting reversible and permanent birth control methods.  The patient has no desire for future childbearing and wishes to have permanent sterilization.  She has no other concerns or complaints today.    Review of Systems   The following systems were reviewed and negative;  constitution, eyes, ENT, respiratory, cardiovascular, gastrointestinal, genitourinary, integument, breast, hematologic / lymphatic, musculoskeletal, neurological, behavioral/psych, endocrine and allergies / immunologic.    Personal History     Past Medical History:   Diagnosis Date   • ADHD    • Anxiety disorder 2017   • Chronic allergic rhinitis    • Hypertension    • Hypothyroidism 2020   • Migraine headache 02/10/2020       Past Surgical History:   Procedure Laterality Date   • APPENDECTOMY     • CHOLECYSTECTOMY     • TONSILLECTOMY         Family History: family history includes Colon cancer in her paternal grandfather; Diabetes in her maternal grandfather, maternal grandmother, and mother; Heart disease in her paternal grandfather; Hypertension in her father, maternal grandfather, and mother; Hypothyroidism in an other family member; Kidney nephrosis in her mother; Prostate cancer in her paternal grandfather. Otherwise pertinent FHx was reviewed and not pertinent to current issue.    Social History:  reports that she has never smoked. She has never used smokeless tobacco. She reports current alcohol use. Drug use questions deferred to the physician.    Home Medications:  EPINEPHrine, Prenatal  Vit-Fe Fumarate-FA, Rimegepant Sulfate, Viloxazine HCl ER, busPIRone, levothyroxine, lisinopril, and ondansetron      Allergies:  No Known Allergies    Objective   Objective     Vitals:   Temp:  [97.6 °F (36.4 °C)] 97.6 °F (36.4 °C)  Heart Rate:  [90] 90  Resp:  [16] 16  BP: (116)/(82) 116/82  Physical Exam    Constitutional: Awake, alert   Eyes: PERRLA, sclerae anicteric, no conjunctival injection   HENT: NCAT, mucous membranes moist   Neck: Supple, no thyromegaly, no lymphadenopathy, trachea midline   Respiratory: Clear to auscultation bilaterally, nonlabored respirations    Cardiovascular: RRR, no murmurs, rubs, or gallops, palpable pedal pulses bilaterally   Gastrointestinal: Positive bowel sounds, soft, nontender, nondistended              Genitourinary: Normal external genitalia, vagina, and cervix.  The uterus is approximately 8 cm mobile and nontender.  There are no palpable uterine or adnexal masses.   Musculoskeletal: No bilateral ankle edema, no clubbing or cyanosis to extremities   Psychiatric: Appropriate affect, cooperative   Neurologic: Oriented x 3, strength symmetric in all extremities, speech clear   Skin: No rashes     Result Review    Result Review:  I have personally reviewed the results from the time of this admission to 8/4/2022 09:54 EDT and agree with these findings:  [x]  Laboratory  []  Microbiology  [x]  Radiology  []  EKG/Telemetry   []  Cardiology/Vascular   [x]  Pathology  [x]  Old records  []  Other:      Assessment & Plan   Assessment / Plan     Active Hospital Problems:  Active Hospital Problems    Diagnosis    • **Unwanted fertility      Added automatically from request for surgery [id number]       Plan:   We have discussed the risks, benefits, and alternatives to the procedure.  We discussed the risks including the risk of infection, bleeding and hemorrhage, injury to nearby structure including, bowel, bladder, pelvic vasculature, pelvic nerves, ovaries, and the uterus, and other  nearby structures.  We have discussed the risks of regret, risk of failure, and if failure occurred and increased risk for ectopic pregnancy.  We discussed the risk of menstrual changes, hormonal changes, and risk of pelvic pain post sterilization.  The patient has been offered alternative forms of birth control including: Oral contraceptives, NuvaRing, birth control patches, progesterone only birth control pills, Depo-Provera, all intrauterine contraceptives, partner vasectomy.  The patient expresses her understanding and wished to proceed with female permanent sterilization.  We have discussed the different methods of female sterilization including hysteroscopic and laparoscopic techniques.  With the laparoscopic techniques we have discussed occlusion of the tube with Filshie clips, Hulka clips, and Falope-Rings.  We have discussed cauterization of the fallopian tube, partial salpingectomy, and complete salpingectomy.  The patient has elected to proceed with bilateral salpingectomy.  She understands this is completely permanent and irreversible.  She understands her only option for future childbearing would be in vitro fertilization.      Electronically signed by Morgan Cheng MD, 08/03/22, 7:40 AM EDT.

## 2022-08-04 ENCOUNTER — ANESTHESIA (OUTPATIENT)
Dept: PERIOP | Facility: HOSPITAL | Age: 27
End: 2022-08-04

## 2022-08-04 ENCOUNTER — ANESTHESIA EVENT (OUTPATIENT)
Dept: PERIOP | Facility: HOSPITAL | Age: 27
End: 2022-08-04

## 2022-08-04 ENCOUNTER — HOSPITAL ENCOUNTER (OUTPATIENT)
Facility: HOSPITAL | Age: 27
Setting detail: HOSPITAL OUTPATIENT SURGERY
Discharge: HOME | End: 2022-08-04
Attending: OBSTETRICS & GYNECOLOGY | Admitting: OBSTETRICS & GYNECOLOGY

## 2022-08-04 VITALS
DIASTOLIC BLOOD PRESSURE: 63 MMHG | SYSTOLIC BLOOD PRESSURE: 120 MMHG | RESPIRATION RATE: 16 BRPM | HEART RATE: 66 BPM | HEIGHT: 66 IN | BODY MASS INDEX: 22.92 KG/M2 | OXYGEN SATURATION: 97 % | WEIGHT: 142.64 LBS | TEMPERATURE: 97.5 F

## 2022-08-04 DIAGNOSIS — Z30.09 UNWANTED FERTILITY: ICD-10-CM

## 2022-08-04 DIAGNOSIS — Z90.79 H/O BILATERAL SALPINGECTOMY: Primary | ICD-10-CM

## 2022-08-04 LAB
ABO GROUP BLD: NORMAL
ABO GROUP BLD: NORMAL
BASOPHILS # BLD AUTO: 0.05 10*3/MM3 (ref 0–0.2)
BASOPHILS NFR BLD AUTO: 0.7 % (ref 0–1.5)
BLD GP AB SCN SERPL QL: NEGATIVE
DEPRECATED RDW RBC AUTO: 38 FL (ref 37–54)
EOSINOPHIL # BLD AUTO: 0.14 10*3/MM3 (ref 0–0.4)
EOSINOPHIL NFR BLD AUTO: 1.9 % (ref 0.3–6.2)
ERYTHROCYTE [DISTWIDTH] IN BLOOD BY AUTOMATED COUNT: 12.4 % (ref 12.3–15.4)
HCG INTACT+B SERPL-ACNC: <0.5 MIU/ML
HCT VFR BLD AUTO: 38.8 % (ref 34–46.6)
HGB BLD-MCNC: 13.4 G/DL (ref 12–15.9)
IMM GRANULOCYTES # BLD AUTO: 0.03 10*3/MM3 (ref 0–0.05)
IMM GRANULOCYTES NFR BLD AUTO: 0.4 % (ref 0–0.5)
LYMPHOCYTES # BLD AUTO: 2.3 10*3/MM3 (ref 0.7–3.1)
LYMPHOCYTES NFR BLD AUTO: 30.6 % (ref 19.6–45.3)
MCH RBC QN AUTO: 29.5 PG (ref 26.6–33)
MCHC RBC AUTO-ENTMCNC: 34.5 G/DL (ref 31.5–35.7)
MCV RBC AUTO: 85.3 FL (ref 79–97)
MONOCYTES # BLD AUTO: 0.62 10*3/MM3 (ref 0.1–0.9)
MONOCYTES NFR BLD AUTO: 8.3 % (ref 5–12)
NEUTROPHILS NFR BLD AUTO: 4.37 10*3/MM3 (ref 1.7–7)
NEUTROPHILS NFR BLD AUTO: 58.1 % (ref 42.7–76)
NRBC BLD AUTO-RTO: 0 /100 WBC (ref 0–0.2)
PLATELET # BLD AUTO: 234 10*3/MM3 (ref 140–450)
PMV BLD AUTO: 8.9 FL (ref 6–12)
RBC # BLD AUTO: 4.55 10*6/MM3 (ref 3.77–5.28)
RH BLD: POSITIVE
RH BLD: POSITIVE
T&S EXPIRATION DATE: NORMAL
WBC NRBC COR # BLD: 7.51 10*3/MM3 (ref 3.4–10.8)

## 2022-08-04 PROCEDURE — 58661 LAPAROSCOPY REMOVE ADNEXA: CPT | Performed by: OBSTETRICS & GYNECOLOGY

## 2022-08-04 PROCEDURE — 86900 BLOOD TYPING SEROLOGIC ABO: CPT | Performed by: OBSTETRICS & GYNECOLOGY

## 2022-08-04 PROCEDURE — 93005 ELECTROCARDIOGRAM TRACING: CPT | Performed by: OBSTETRICS & GYNECOLOGY

## 2022-08-04 PROCEDURE — 25010000002 MIDAZOLAM PER 1 MG: Performed by: ANESTHESIOLOGY

## 2022-08-04 PROCEDURE — 86900 BLOOD TYPING SEROLOGIC ABO: CPT

## 2022-08-04 PROCEDURE — 25010000002 PROPOFOL 10 MG/ML EMULSION: Performed by: NURSE ANESTHETIST, CERTIFIED REGISTERED

## 2022-08-04 PROCEDURE — 88302 TISSUE EXAM BY PATHOLOGIST: CPT | Performed by: OBSTETRICS & GYNECOLOGY

## 2022-08-04 PROCEDURE — 84702 CHORIONIC GONADOTROPIN TEST: CPT | Performed by: OBSTETRICS & GYNECOLOGY

## 2022-08-04 PROCEDURE — 25010000002 KETOROLAC TROMETHAMINE PER 15 MG: Performed by: NURSE ANESTHETIST, CERTIFIED REGISTERED

## 2022-08-04 PROCEDURE — 86850 RBC ANTIBODY SCREEN: CPT | Performed by: OBSTETRICS & GYNECOLOGY

## 2022-08-04 PROCEDURE — 25010000002 DEXAMETHASONE PER 1 MG: Performed by: NURSE ANESTHETIST, CERTIFIED REGISTERED

## 2022-08-04 PROCEDURE — 86901 BLOOD TYPING SEROLOGIC RH(D): CPT

## 2022-08-04 PROCEDURE — 25010000002 CEFAZOLIN IN DEXTROSE 2-4 GM/100ML-% SOLUTION: Performed by: OBSTETRICS & GYNECOLOGY

## 2022-08-04 PROCEDURE — 25010000002 ONDANSETRON PER 1 MG: Performed by: NURSE ANESTHETIST, CERTIFIED REGISTERED

## 2022-08-04 PROCEDURE — 85025 COMPLETE CBC W/AUTO DIFF WBC: CPT | Performed by: OBSTETRICS & GYNECOLOGY

## 2022-08-04 PROCEDURE — 86901 BLOOD TYPING SEROLOGIC RH(D): CPT | Performed by: OBSTETRICS & GYNECOLOGY

## 2022-08-04 PROCEDURE — 25010000002 FENTANYL CITRATE (PF) 50 MCG/ML SOLUTION: Performed by: NURSE ANESTHETIST, CERTIFIED REGISTERED

## 2022-08-04 RX ORDER — PROPOFOL 10 MG/ML
VIAL (ML) INTRAVENOUS AS NEEDED
Status: DISCONTINUED | OUTPATIENT
Start: 2022-08-04 | End: 2022-08-04 | Stop reason: SURG

## 2022-08-04 RX ORDER — MEPERIDINE HYDROCHLORIDE 25 MG/ML
12.5 INJECTION INTRAMUSCULAR; INTRAVENOUS; SUBCUTANEOUS
Status: DISCONTINUED | OUTPATIENT
Start: 2022-08-04 | End: 2022-08-04 | Stop reason: HOSPADM

## 2022-08-04 RX ORDER — ROCURONIUM BROMIDE 10 MG/ML
INJECTION, SOLUTION INTRAVENOUS AS NEEDED
Status: DISCONTINUED | OUTPATIENT
Start: 2022-08-04 | End: 2022-08-04 | Stop reason: SURG

## 2022-08-04 RX ORDER — ONDANSETRON 2 MG/ML
INJECTION INTRAMUSCULAR; INTRAVENOUS AS NEEDED
Status: DISCONTINUED | OUTPATIENT
Start: 2022-08-04 | End: 2022-08-04 | Stop reason: SURG

## 2022-08-04 RX ORDER — ONDANSETRON 2 MG/ML
4 INJECTION INTRAMUSCULAR; INTRAVENOUS EVERY 6 HOURS PRN
Status: DISCONTINUED | OUTPATIENT
Start: 2022-08-04 | End: 2022-08-04 | Stop reason: HOSPADM

## 2022-08-04 RX ORDER — KETOROLAC TROMETHAMINE 30 MG/ML
INJECTION, SOLUTION INTRAMUSCULAR; INTRAVENOUS AS NEEDED
Status: DISCONTINUED | OUTPATIENT
Start: 2022-08-04 | End: 2022-08-04 | Stop reason: SURG

## 2022-08-04 RX ORDER — ACETAMINOPHEN 500 MG
1000 TABLET ORAL ONCE
Status: COMPLETED | OUTPATIENT
Start: 2022-08-04 | End: 2022-08-04

## 2022-08-04 RX ORDER — HYDROCODONE BITARTRATE AND ACETAMINOPHEN 5; 325 MG/1; MG/1
1-2 TABLET ORAL EVERY 6 HOURS PRN
Qty: 14 TABLET | Refills: 0 | Status: SHIPPED | OUTPATIENT
Start: 2022-08-04 | End: 2022-08-22

## 2022-08-04 RX ORDER — SCOLOPAMINE TRANSDERMAL SYSTEM 1 MG/1
1 PATCH, EXTENDED RELEASE TRANSDERMAL ONCE
Status: DISCONTINUED | OUTPATIENT
Start: 2022-08-04 | End: 2022-08-04 | Stop reason: HOSPADM

## 2022-08-04 RX ORDER — MIDAZOLAM HYDROCHLORIDE 1 MG/ML
2 INJECTION INTRAMUSCULAR; INTRAVENOUS ONCE
Status: COMPLETED | OUTPATIENT
Start: 2022-08-04 | End: 2022-08-04

## 2022-08-04 RX ORDER — SODIUM CHLORIDE, SODIUM LACTATE, POTASSIUM CHLORIDE, CALCIUM CHLORIDE 600; 310; 30; 20 MG/100ML; MG/100ML; MG/100ML; MG/100ML
9 INJECTION, SOLUTION INTRAVENOUS CONTINUOUS PRN
Status: DISCONTINUED | OUTPATIENT
Start: 2022-08-04 | End: 2022-08-04 | Stop reason: HOSPADM

## 2022-08-04 RX ORDER — CEFAZOLIN SODIUM 2 G/100ML
2 INJECTION, SOLUTION INTRAVENOUS ONCE
Status: COMPLETED | OUTPATIENT
Start: 2022-08-04 | End: 2022-08-04

## 2022-08-04 RX ORDER — PROMETHAZINE HYDROCHLORIDE 12.5 MG/1
25 TABLET ORAL ONCE AS NEEDED
Status: DISCONTINUED | OUTPATIENT
Start: 2022-08-04 | End: 2022-08-04 | Stop reason: HOSPADM

## 2022-08-04 RX ORDER — SODIUM CHLORIDE, SODIUM LACTATE, POTASSIUM CHLORIDE, CALCIUM CHLORIDE 600; 310; 30; 20 MG/100ML; MG/100ML; MG/100ML; MG/100ML
125 INJECTION, SOLUTION INTRAVENOUS CONTINUOUS
Status: DISCONTINUED | OUTPATIENT
Start: 2022-08-04 | End: 2022-08-04 | Stop reason: HOSPADM

## 2022-08-04 RX ORDER — ONDANSETRON 2 MG/ML
4 INJECTION INTRAMUSCULAR; INTRAVENOUS ONCE AS NEEDED
Status: DISCONTINUED | OUTPATIENT
Start: 2022-08-04 | End: 2022-08-04 | Stop reason: HOSPADM

## 2022-08-04 RX ORDER — DEXMEDETOMIDINE HYDROCHLORIDE 100 UG/ML
INJECTION, SOLUTION INTRAVENOUS AS NEEDED
Status: DISCONTINUED | OUTPATIENT
Start: 2022-08-04 | End: 2022-08-04 | Stop reason: SURG

## 2022-08-04 RX ORDER — PROMETHAZINE HYDROCHLORIDE 25 MG/1
25 SUPPOSITORY RECTAL ONCE AS NEEDED
Status: DISCONTINUED | OUTPATIENT
Start: 2022-08-04 | End: 2022-08-04 | Stop reason: HOSPADM

## 2022-08-04 RX ORDER — BUPIVACAINE HYDROCHLORIDE AND EPINEPHRINE 5; 5 MG/ML; UG/ML
INJECTION, SOLUTION EPIDURAL; INTRACAUDAL; PERINEURAL AS NEEDED
Status: DISCONTINUED | OUTPATIENT
Start: 2022-08-04 | End: 2022-08-04 | Stop reason: HOSPADM

## 2022-08-04 RX ORDER — IBUPROFEN 600 MG/1
600 TABLET ORAL EVERY 6 HOURS PRN
Status: DISCONTINUED | OUTPATIENT
Start: 2022-08-04 | End: 2022-08-04 | Stop reason: HOSPADM

## 2022-08-04 RX ORDER — DEXAMETHASONE SODIUM PHOSPHATE 4 MG/ML
INJECTION, SOLUTION INTRA-ARTICULAR; INTRALESIONAL; INTRAMUSCULAR; INTRAVENOUS; SOFT TISSUE AS NEEDED
Status: DISCONTINUED | OUTPATIENT
Start: 2022-08-04 | End: 2022-08-04 | Stop reason: SURG

## 2022-08-04 RX ORDER — IBUPROFEN 600 MG/1
600 TABLET ORAL EVERY 6 HOURS PRN
Qty: 60 TABLET | Refills: 1 | Status: SHIPPED | OUTPATIENT
Start: 2022-08-04 | End: 2022-08-22

## 2022-08-04 RX ORDER — LIDOCAINE HYDROCHLORIDE 20 MG/ML
INJECTION, SOLUTION EPIDURAL; INFILTRATION; INTRACAUDAL; PERINEURAL AS NEEDED
Status: DISCONTINUED | OUTPATIENT
Start: 2022-08-04 | End: 2022-08-04 | Stop reason: SURG

## 2022-08-04 RX ORDER — OXYCODONE HYDROCHLORIDE 5 MG/1
5 TABLET ORAL
Status: DISCONTINUED | OUTPATIENT
Start: 2022-08-04 | End: 2022-08-04 | Stop reason: HOSPADM

## 2022-08-04 RX ORDER — FENTANYL CITRATE 50 UG/ML
INJECTION, SOLUTION INTRAMUSCULAR; INTRAVENOUS AS NEEDED
Status: DISCONTINUED | OUTPATIENT
Start: 2022-08-04 | End: 2022-08-04 | Stop reason: SURG

## 2022-08-04 RX ADMIN — ACETAMINOPHEN 1000 MG: 500 TABLET ORAL at 08:37

## 2022-08-04 RX ADMIN — ROCURONIUM BROMIDE 40 MG: 10 INJECTION INTRAVENOUS at 10:10

## 2022-08-04 RX ADMIN — SCOPALAMINE 1 PATCH: 1 PATCH, EXTENDED RELEASE TRANSDERMAL at 08:38

## 2022-08-04 RX ADMIN — SODIUM CHLORIDE, POTASSIUM CHLORIDE, SODIUM LACTATE AND CALCIUM CHLORIDE 9 ML/HR: 600; 310; 30; 20 INJECTION, SOLUTION INTRAVENOUS at 08:37

## 2022-08-04 RX ADMIN — KETOROLAC TROMETHAMINE 30 MG: 30 INJECTION, SOLUTION INTRAMUSCULAR; INTRAVENOUS at 10:21

## 2022-08-04 RX ADMIN — MIDAZOLAM HYDROCHLORIDE 2 MG: 2 INJECTION, SOLUTION INTRAMUSCULAR; INTRAVENOUS at 09:57

## 2022-08-04 RX ADMIN — LIDOCAINE HYDROCHLORIDE 50 MG: 20 INJECTION, SOLUTION EPIDURAL; INFILTRATION; INTRACAUDAL; PERINEURAL at 10:10

## 2022-08-04 RX ADMIN — SODIUM CHLORIDE, POTASSIUM CHLORIDE, SODIUM LACTATE AND CALCIUM CHLORIDE: 600; 310; 30; 20 INJECTION, SOLUTION INTRAVENOUS at 11:06

## 2022-08-04 RX ADMIN — DEXMEDETOMIDINE HYDROCHLORIDE 10 MCG: 100 INJECTION, SOLUTION, CONCENTRATE INTRAVENOUS at 10:52

## 2022-08-04 RX ADMIN — FENTANYL CITRATE 100 MCG: 50 INJECTION, SOLUTION INTRAMUSCULAR; INTRAVENOUS at 10:10

## 2022-08-04 RX ADMIN — PROPOFOL 150 MG: 10 INJECTION, EMULSION INTRAVENOUS at 10:10

## 2022-08-04 RX ADMIN — DEXAMETHASONE SODIUM PHOSPHATE 4 MG: 4 INJECTION, SOLUTION INTRA-ARTICULAR; INTRALESIONAL; INTRAMUSCULAR; INTRAVENOUS; SOFT TISSUE at 10:21

## 2022-08-04 RX ADMIN — CEFAZOLIN SODIUM 2 G: 2 INJECTION, SOLUTION INTRAVENOUS at 10:09

## 2022-08-04 RX ADMIN — ONDANSETRON 4 MG: 2 INJECTION INTRAMUSCULAR; INTRAVENOUS at 10:21

## 2022-08-04 RX ADMIN — SUGAMMADEX 200 MG: 100 INJECTION, SOLUTION INTRAVENOUS at 11:07

## 2022-08-04 NOTE — DISCHARGE INSTRUCTIONS
No driving while taking narcotic pain medications  No lifting more than 15 to 20 pounds for 1 to 2 weeks  No intercourse until follow-up  Keep the incisions clean and dry  Remove outer bandage 24 hours after surgery  Remove inner bandage/Steri-Strips 1 week from surgery  Call for temperature greater than 100 °F, shortness of breath or chest pain, heavy vaginal bleeding soaking a pad in less than 1 hour, redness swelling or drainage from the incisions, excessive nausea or vomiting, or pain that is worsening despite current pain medications     DISCHARGE INSTRUCTIONS  GYNECOLOGICAL  PROCEDURES      For your surgery you had:  General anesthesia (you may have a sore throat for the first 24 hours)  You received an anesthesia medication today that can cause hormonal forms of birth control to be ineffective. You should use a different form of birth control (to prevent pregnancy) for 7 days.   You may experience dizziness, drowsiness, or lightheadedness for several hours following surgery.  Do not stay alone today or tonight.  Limit your activity for 24 hours.  Resume your diet slowly.  Follow any special dietary instructions you may have been given by your doctor.  You should not drive or operate machinery, drink alcohol, or sign legally binding documents for 24 hours or while you are taking pain medication.    Last dose of pain medication was given at: Tylenol 1000mg given at 0837 ok to take tylenol at 1237, do not take tylenol with hydrocodone because it has tylenol in it. Do not exceed 4000mg of tylenol in 24 hrs. Toradol at 1021 ok to take ibuprofen at 4:21   .  NOTIFY YOUR DOCTOR IF YOU EXPERIENCE ANY OF THE FOLLOWING:  Temperature greater than 101 degrees Fahrenheit  Shaking Chills  Redness or excessive drainage from incision  Nausea, vomiting and/or pain that is not controlled by prescribed medications  Increase in bleeding or bleeding that is excessive  Unable to urinate in 6 hours after surgery  If unable to reach  your doctor, please go to the closest Emergency Room [] Remove dressing in: 24 hours    [] You may resume intercourse and the use of tampons as your physician has instructed you.  [] Nothing in the vagina for 2 weeks to include intercourse, douches, or tampons.  [] Vaginal bleeding may be expected for several days with flow decreasing with time and never any heavier than a normal   period.  If you have foul smelling discharge, notify your physician.  Medications per physician instructions as indicated on Discharge Medication Information Sheet.  You should see   for follow-up care   on   .  Phone number:      SPECIAL INSTRUCTIONS:   Office will call you for follow up appointment.

## 2022-08-04 NOTE — ANESTHESIA POSTPROCEDURE EVALUATION
Patient: Alicia Rodriguez    Procedure Summary     Date: 08/04/22 Room / Location: [facility] OR 01 / [facility] MAIN OR    Anesthesia Start: 1006 Anesthesia Stop: 1117    Procedure: SALPINGECTOMY LAPAROSCOPIC (Bilateral Abdomen) Diagnosis:       Unwanted fertility      (Unwanted fertility [Z30.09])    Surgeons: Morgan Cheng MD Provider: Kaden Pham MD    Anesthesia Type: general ASA Status: 2          Anesthesia Type: general    Vitals  Vitals Value Taken Time   /80 08/04/22 1126   Temp 37.9 °C (100.2 °F) 08/04/22 1115   Pulse 86 08/04/22 1129   Resp 18 08/04/22 1120   SpO2 98 % 08/04/22 1129   Vitals shown include unvalidated device data.        Post Anesthesia Care and Evaluation    Patient location during evaluation: bedside  Patient participation: complete - patient participated  Level of consciousness: awake  Pain management: adequate    Airway patency: patent  Anesthetic complications: No anesthetic complications  PONV Status: none  Cardiovascular status: acceptable and stable  Respiratory status: acceptable and room air  Hydration status: acceptable    Comments: An Anesthesiologist personally participated in the most demanding procedures (including induction and emergence if applicable) in the anesthesia plan, monitored the course of anesthesia administration at frequent intervals and remained physically present and available for immediate diagnosis and treatment of emergencies.

## 2022-08-04 NOTE — ANESTHESIA PREPROCEDURE EVALUATION
Anesthesia Evaluation     Patient summary reviewed and Nursing notes reviewed   no history of anesthetic complications:  NPO Solid Status: > 8 hours  NPO Liquid Status: > 2 hours           Airway   Mallampati: II  TM distance: >3 FB  Neck ROM: full  No difficulty expected  Dental      Pulmonary - negative pulmonary ROS and normal exam    breath sounds clear to auscultation  Cardiovascular - normal exam  Exercise tolerance: excellent (>7 METS)    Rhythm: regular  Rate: normal    (+) hypertension,       Neuro/Psych- negative ROS  GI/Hepatic/Renal/Endo    (+)   thyroid problem hypothyroidism    Musculoskeletal     Abdominal    Substance History      OB/GYN          Other                        Anesthesia Plan    ASA 2     general     (Patient understands anesthesia not responsible for dental damage.)  intravenous induction     Anesthetic plan, risks, benefits, and alternatives have been provided, discussed and informed consent has been obtained with: patient.        CODE STATUS:

## 2022-08-04 NOTE — OP NOTE
SALPINGECTOMY LAPAROSCOPIC  Procedure Report    Patient Name:  Alicia Rodriguez  YOB: 1995    Date of Surgery:  8/4/2022     Pre-op Diagnosis:   Unwanted fertility [Z30.09]       Post-Op Diagnosis Codes:     * Unwanted fertility [Z30.09]      Procedure(s):  SALPINGECTOMY LAPAROSCOPIC    Staff:  Surgeon(s):  Morgan Cheng MD    Anesthesia: General    Estimated Blood Loss: 10 mL      Specimen:          Specimens     ID Source Type Tests Collected By Collected At Frozen?    A Fallopian Tube, Left Tissue · TISSUE PATHOLOGY EXAM   Morgan Cheng MD 8/4/22 0953 No    Description: LEFT FALLOPIAN TUBE    B Fallopian Tube, Right Tissue · TISSUE PATHOLOGY EXAM   Morgan Cheng MD 8/4/22 0953 No    Description: RIGHT FALLOPIAN TUBE              Findings: Normal external genitalia, vagina, and cervix.  The uterus, bilateral ovaries and bilateral fallopian tubes appeared grossly normal.  There were no pelvic adhesions or evidence of endometriosis.    Complications: None    Description of Procedure: After reviewing the informed consent including the risks benefits and alternatives to the procedure, the patient expressed her understanding and desire to proceed.  She was taken to the operating room with an IV in place and running.  She was placed on the operating table in the dorsal supine position.  The patient was given a general anesthetic, and intubated endotracheally.  The patient was then repositioned into the dorsal lithotomy position.  Her arms were tucked to the side, and her legs were placed in yellow-fin stirrups.  We took care in positioning both the arms and legs, padding any potential pressure points.  The bladder was drained in a sterile fashion with an in and out catheterization. After a surgical time-out was performed, the patient was prepped and draped in the usual sterile fashion.  I placed a sponge stick in the vagina.  I then changed my gloves, and proceeded to the  abdomen.    After ensuring the patient was flat, and had an orogastric tube in place, I infiltrated in the planned incision sites with a total of 10mL of half percent Marcaine with epinephrine.  I made a horizontal infraumbilical skin incision with the scalpel.  With anterior traction on the anterior abdominal wall I inserted the Veress needle into the peritoneal cavity without difficulty.  Free-flowing saline through the Veress needle confirmed my intraperitoneal location.  Opening pressures were less than 7 mm of mercury.  The abdomen was insufflated to 25 mm of mercury. I then used a 5 mm Optiview trocar to enter the peritoneal cavity under direct visualization.      I then surveyed the area below the trocar insertion site, and there was no evidence of any bowel or vascular injury.  At this time I placed an 5 mm trocar in the left lower quadrant and right lower quadrant, under direct visualization.  The intra-abdominal pressure were reduced to 15 mm of mercury.  The patient was placed in Trendelenburg position, and the bowel was evacuated out of the pelvis with a blunt probe.    The uterus was normal size, the fallopian tubes and ovaries were normal bilaterally.  There was no pelvic adhesions and there was no evidence of endometriosis.  The posterior cul-de-sac was normal.  I tented up the right fallopian tube, then using the LigaSure sealed and divided the distal end of the mesosalpinx just between the ovary and fallopian tube.  I carried this plane of dissection proximally just underneath the fallopian tube to the cornual region of the uterus.  I then amputated the fallopian tube, and extracted through the umbilical trocar.  I carried out the same procedure on the left side, completing the salpingectomy.  The operative pressures were lowered to 5 mm mercury. I surveyed my operative tissue pedicles, and there was excellent hemostasis.      This completed the my procedure.  The right lower and left lower quadrant  trochars were removed.  The seat to the umbilical trocar was removed, allowing complete evacuation of the pneumoperitoneum in patient's abdomen. The trocar cannula was then removed.  The skin sites were then reapproximated with simple, interrupted, subcuticular 4-0 Monocryl sutures.  The incisions were then reinforced with Mastisol and Steri-Strips, and covered with a coverlet.  The sponge stick was removed from the vagina.  The patient was taken out of lithotomy position, awoken from her general anesthesia, and taken to the recovery room in satisfactory condition.  All counts were correct x2, and the patient received Kefzol as her preoperative antibiotics.  The patient will be discharged home which she meets PACU criteria.  The patient will follow up with me in approximately 2 weeks, and this appointment will be made for her prior to discharge.  For discharge medications please see the medication reconciliation page.  The patient was instructed to call the office for any of the following: Temperature greater than 100F, shortness of breath or chest pain, pain that is worsening despite current pain medications, excessive nausea or vomiting, redness swelling or drainage from the incisions, heavy vaginal bleeding, or other concerns.       was responsible for performing the following activities: Retraction and Held/Positioned Camera and their skilled assistance was necessary for the success of this case.    Morgan Cheng MD     Date: 8/4/2022  Time: 11:30 EDT

## 2022-08-05 ENCOUNTER — CLINICAL SUPPORT (OUTPATIENT)
Dept: OBSTETRICS AND GYNECOLOGY | Facility: CLINIC | Age: 27
End: 2022-08-05

## 2022-08-05 DIAGNOSIS — G89.18 POST-OP PAIN: Primary | ICD-10-CM

## 2022-08-05 LAB
CYTO UR: NORMAL
LAB AP CASE REPORT: NORMAL
LAB AP CLINICAL INFORMATION: NORMAL
PATH REPORT.FINAL DX SPEC: NORMAL
PATH REPORT.GROSS SPEC: NORMAL

## 2022-08-05 PROCEDURE — 99024 POSTOP FOLLOW-UP VISIT: CPT | Performed by: OBSTETRICS & GYNECOLOGY

## 2022-08-05 NOTE — PROGRESS NOTES
Patient came in today requesting for her incision to be checked. Patient had surgery on 8/4/22 with J.   Tissue Pathology Exam (08/04/2022 09:53)   Op Note by Morgan Cheng MD (08/04/2022 10:42)   Incision was very bruised but patient stated she wasn't in any pain. Evelyne Miller RN Nurse Maternal Navigator was in the room to assist with the check. Evelyne did an exam with me on all incisions, incisions are healing very well with that one incision being bruised. No active bleeding from the incision patient was worried about. Bruising looked to be about 6cm x 2cm approximately. Evelyne stated it was a small hematoma but it was nothing concerning. Patient wasn't in any active pain. I informed patient if she begins to start having pain in any of her incisions or they begin to look worse, have active bleeding, or discharge over the weekend that was concerning to go to the ER to get evaluated since the office is closed on the weekends.Patient is aware of all the things to look out for and patient will follow up with Dr. Cheng at her two week post-op visit.

## 2022-08-12 LAB — QT INTERVAL: 351 MS

## 2022-08-22 ENCOUNTER — OFFICE VISIT (OUTPATIENT)
Dept: FAMILY MEDICINE CLINIC | Facility: CLINIC | Age: 27
End: 2022-08-22

## 2022-08-22 ENCOUNTER — OFFICE VISIT (OUTPATIENT)
Dept: OBSTETRICS AND GYNECOLOGY | Facility: CLINIC | Age: 27
End: 2022-08-22

## 2022-08-22 ENCOUNTER — TELEPHONE (OUTPATIENT)
Dept: FAMILY MEDICINE CLINIC | Facility: CLINIC | Age: 27
End: 2022-08-22

## 2022-08-22 VITALS
HEART RATE: 112 BPM | BODY MASS INDEX: 22.02 KG/M2 | SYSTOLIC BLOOD PRESSURE: 110 MMHG | DIASTOLIC BLOOD PRESSURE: 79 MMHG | HEIGHT: 66 IN | WEIGHT: 137 LBS

## 2022-08-22 VITALS
SYSTOLIC BLOOD PRESSURE: 124 MMHG | OXYGEN SATURATION: 97 % | HEART RATE: 109 BPM | TEMPERATURE: 97.9 F | WEIGHT: 137 LBS | BODY MASS INDEX: 22.11 KG/M2 | DIASTOLIC BLOOD PRESSURE: 98 MMHG

## 2022-08-22 DIAGNOSIS — G43.819 OTHER MIGRAINE WITHOUT STATUS MIGRAINOSUS, INTRACTABLE: Primary | ICD-10-CM

## 2022-08-22 DIAGNOSIS — Z48.89 POSTOPERATIVE VISIT: Primary | ICD-10-CM

## 2022-08-22 PROBLEM — Z30.09 UNWANTED FERTILITY: Status: RESOLVED | Noted: 2022-07-18 | Resolved: 2022-08-22

## 2022-08-22 PROBLEM — Z90.79 H/O BILATERAL SALPINGECTOMY: Status: RESOLVED | Noted: 2022-08-04 | Resolved: 2022-08-22

## 2022-08-22 PROCEDURE — 99024 POSTOP FOLLOW-UP VISIT: CPT | Performed by: OBSTETRICS & GYNECOLOGY

## 2022-08-22 PROCEDURE — 96372 THER/PROPH/DIAG INJ SC/IM: CPT | Performed by: NURSE PRACTITIONER

## 2022-08-22 PROCEDURE — 99213 OFFICE O/P EST LOW 20 MIN: CPT | Performed by: NURSE PRACTITIONER

## 2022-08-22 RX ORDER — KETOROLAC TROMETHAMINE 30 MG/ML
60 INJECTION, SOLUTION INTRAMUSCULAR; INTRAVENOUS ONCE
Status: COMPLETED | OUTPATIENT
Start: 2022-08-22 | End: 2022-08-22

## 2022-08-22 RX ORDER — ONDANSETRON 2 MG/ML
4 INJECTION INTRAMUSCULAR; INTRAVENOUS ONCE
Status: COMPLETED | OUTPATIENT
Start: 2022-08-22 | End: 2022-08-22

## 2022-08-22 RX ORDER — ONDANSETRON 2 MG/ML
4 INJECTION INTRAMUSCULAR; INTRAVENOUS EVERY 6 HOURS PRN
Status: DISCONTINUED | OUTPATIENT
Start: 2022-08-22 | End: 2022-08-22

## 2022-08-22 RX ADMIN — ONDANSETRON 4 MG: 2 INJECTION INTRAMUSCULAR; INTRAVENOUS at 16:36

## 2022-08-22 RX ADMIN — KETOROLAC TROMETHAMINE 60 MG: 30 INJECTION, SOLUTION INTRAMUSCULAR; INTRAVENOUS at 16:29

## 2022-08-22 NOTE — PROGRESS NOTES
"Post Operative Visit      CC: Post operative follow up    HPI:   Pain:  No  Vaginal bleeding:  No  Vaginal discharge:  No  Fever/chills:  No  Good appetite:  Yes  Normal bladder function:  Yes  Normal bowel function:  Yes    Operative report, surgical findings and any pathology reviewed.    /79   Pulse 112   Ht 167.6 cm (66\")   Wt 62.1 kg (137 lb)   LMP 08/08/2022   Breastfeeding No   BMI 22.11 kg/m²     Physical Exam  Vitals and nursing note reviewed.   Constitutional:       General: She is not in acute distress.     Appearance: Normal appearance. She is not ill-appearing.   Abdominal:      General: Abdomen is flat. There is no distension.      Palpations: Abdomen is soft. There is no mass.      Tenderness: There is no abdominal tenderness. There is no guarding or rebound.      Hernia: No hernia is present.      Comments: The incisions are clean, dry, intact and healing well.  There are some mild ecchymosis around the umbilical incision in the left lower quadrant incision.   Skin:     General: Skin is warm and dry.      Findings: No rash.   Neurological:      Mental Status: She is alert and oriented to person, place, and time.   Psychiatric:         Mood and Affect: Mood normal.         Behavior: Behavior normal.         Thought Content: Thought content normal.         Judgment: Judgment normal.           ASSESSMENT:  Post-operative exam    Diagnoses and all orders for this visit:    1. Postoperative visit (Primary)  Assessment & Plan:  Status post laparoscopic bilateral salpingectomy  Stable postop course.  Reviewed intraoperative photos and pathology.  May resume all normal activities  No restrictions  Recommend Tylenol and/or ibuprofen for any further postoperative pain          PLAN:    Any available photos and/or pathology were reviewed.  All questions answered.       Follow Up:  No follow-ups on file.      Morgan Cheng MD  08/22/2022      "

## 2022-08-22 NOTE — ASSESSMENT & PLAN NOTE
Status post laparoscopic bilateral salpingectomy  Stable postop course.  Reviewed intraoperative photos and pathology.  May resume all normal activities  No restrictions  Recommend Tylenol and/or ibuprofen for any further postoperative pain

## 2022-08-22 NOTE — PROGRESS NOTES
Chief Complaint  Headache    Subjective          Alicia Rodriguez is a 26 y.o. female who presents to [facility]    History of Present Illness    Migraine onset this morning.  Patient reports severe in intensity patient has taken Nurtec x2 without relief.  Patient has nausea and light sensitivity.     Review of Systems   Eyes: Positive for visual disturbance.   Gastrointestinal: Positive for nausea.   Neurological: Positive for headaches.          Medical History: has a past medical history of ADHD, Anxiety disorder (11/20/2017), Chronic allergic rhinitis, H/O bilateral salpingectomy (8/4/2022), Hypertension, Hypothyroidism (02/27/2020), and Migraine headache (02/10/2020).     Surgical History: has a past surgical history that includes Appendectomy; Cholecystectomy; Tonsillectomy; and Salpingectomy (Bilateral, 8/4/2022).     Family History: family history includes Colon cancer in her paternal grandfather; Diabetes in her maternal grandfather, maternal grandmother, and mother; Heart disease in her paternal grandfather; Hypertension in her father, maternal grandfather, and mother; Hypothyroidism in an other family member; Kidney nephrosis in her mother; Prostate cancer in her paternal grandfather.     Social History: reports that she has never smoked. She has never used smokeless tobacco. She reports current alcohol use. Drug use questions deferred to the physician.    Allergies: Patient has no known allergies.      Health Maintenance Due   Topic Date Due   • ANNUAL PHYSICAL  Never done   • HEPATITIS C SCREENING  Never done            Current Outpatient Medications:   •  busPIRone (BUSPAR) 5 MG tablet, Take 1 tablet by mouth 2 (Two) Times a Day., Disp: 180 tablet, Rfl: 1  •  Nurtec 75 MG dispersible tablet, Take 1 tablet by mouth As Needed (migraine)., Disp: 8 tablet, Rfl: 2  •  ondansetron (ZOFRAN) 4 MG tablet, Take 1 tablet by mouth Every 8 (Eight) Hours As Needed for Nausea., Disp:  10 tablet, Rfl: 0  •  EPINEPHrine (EPIPEN) 0.3 MG/0.3ML solution auto-injector injection, INJECT 0.3ML INTO THE APPROPRIATE MUSCLE AS DIRECTED FOR ONE DOSE, Disp: , Rfl:       Immunization History   Administered Date(s) Administered   • Covid-19 (Pfizer) Gray Cap 03/03/2022, 04/08/2022   • DTaP 02/02/1996, 04/02/1996, 06/04/1996, 06/17/1997, 03/12/2001   • Flu Vaccine Quad PF >36MO 11/20/2017   • Fluzone Quad >6mos (Multi-dose) 10/17/2013   • HPV Quadrivalent 11/13/2008, 06/22/2009, 10/26/2009   • Hep B, Adolescent or Pediatric 1995, 02/02/1996, 06/11/1996   • Hepatitis A 04/21/2018   • Hib (PRP-OMP) 02/02/1996, 04/02/1996, 06/11/1996   • IPV 03/12/2001   • Influenza LAIV (Nasal) 11/13/2008, 12/09/2010   • Influenza TIV (IM) 10/26/2009   • MMR 03/12/1997, 03/12/2001   • Meningococcal MCV4P (Menactra) 10/23/2007   • OPV 02/02/1996, 04/02/1996, 06/11/1996   • Tdap 10/03/2006, 11/29/2016   • Varicella 11/26/1996, 10/03/2006         Objective       Vitals:    08/22/22 1551   BP: 124/98   Pulse: 109   Temp: 97.9 °F (36.6 °C)   TempSrc: Temporal   SpO2: 97%   Weight: 62.1 kg (137 lb)      Body mass index is 22.11 kg/m².   Wt Readings from Last 3 Encounters:   08/22/22 62.1 kg (137 lb)   08/22/22 62.1 kg (137 lb)   08/04/22 64.7 kg (142 lb 10.2 oz)      BP Readings from Last 3 Encounters:   08/22/22 124/98   08/22/22 110/79   08/04/22 120/63        Physical Exam  Constitutional:       Appearance: Normal appearance.   HENT:      Head: Normocephalic.   Eyes:      Pupils: Pupils are equal, round, and reactive to light.   Cardiovascular:      Rate and Rhythm: Normal rate and regular rhythm.      Pulses: Normal pulses.      Heart sounds: Normal heart sounds.   Pulmonary:      Effort: Pulmonary effort is normal.   Musculoskeletal:         General: Normal range of motion.   Skin:     General: Skin is warm and dry.   Neurological:      General: No focal deficit present.      Mental Status: She is alert and oriented to  person, place, and time.   Psychiatric:         Mood and Affect: Mood normal.         Behavior: Behavior normal.         Thought Content: Thought content normal.         Judgment: Judgment normal.             Result Review :       Common labs    Common Labsle 6/20/22 6/20/22 6/20/22 8/4/22    1622 1622 1622    Glucose  91     BUN  13     Creatinine  0.75     Sodium  138     Potassium  4.1     Chloride  102     Calcium  9.2     Albumin  4.70     Total Bilirubin  0.3     Alkaline Phosphatase  60     AST (SGOT)  14     ALT (SGPT)  8     WBC   7.10 7.51   Hemoglobin   13.3 13.4   Hematocrit   41.5 38.8   Platelets   293 234   Total Cholesterol 132      Triglycerides 109      HDL Cholesterol 39 (A)      LDL Cholesterol  73      (A) Abnormal value                             Assessment and Plan        Diagnoses and all orders for this visit:    1. Other migraine without status migrainosus, intractable (Primary)  -     ketorolac (TORADOL) injection 60 mg  -     Discontinue: ondansetron (ZOFRAN) injection 4 mg  -     ondansetron (ZOFRAN) injection 4 mg          Follow Up     Return if symptoms worsen or fail to improve.    Patient was given instructions and counseling regarding her condition or for health maintenance advice. Please see specific information pulled into the AVS if appropriate.     ROBERTO CARLOS Carnes

## 2022-09-01 ENCOUNTER — TELEPHONE (OUTPATIENT)
Dept: FAMILY MEDICINE CLINIC | Facility: CLINIC | Age: 27
End: 2022-09-01

## 2022-09-01 RX ORDER — LISINOPRIL 5 MG/1
5 TABLET ORAL DAILY
COMMUNITY
Start: 2022-08-29 | End: 2023-02-13

## 2022-09-01 NOTE — TELEPHONE ENCOUNTER
Patient called stating she is severely constipated, she believes it is due to the lisinopril. Patient states she has not has a bowel movement in 9 days- she has tried laxatives and an enema but nothing has helped her go to the bathroom. She doesn't know what to do

## 2022-09-01 NOTE — TELEPHONE ENCOUNTER
Called patient and left voicemail message letting her know Linzess prescription was sent to [facility] for her to .  Instructed patient to call office if she had any questions.

## 2022-09-02 ENCOUNTER — TELEPHONE (OUTPATIENT)
Dept: FAMILY MEDICINE CLINIC | Facility: CLINIC | Age: 27
End: 2022-09-02

## 2022-09-02 ENCOUNTER — HOSPITAL ENCOUNTER (OUTPATIENT)
Dept: GENERAL RADIOLOGY | Facility: HOSPITAL | Age: 27
Discharge: HOME | End: 2022-09-02
Admitting: NURSE PRACTITIONER

## 2022-09-02 ENCOUNTER — HOSPITAL ENCOUNTER (EMERGENCY)
Facility: HOSPITAL | Age: 27
Discharge: LEFT BEFORE DISCHARGE | End: 2022-09-02

## 2022-09-02 DIAGNOSIS — K59.00 CONSTIPATION, UNSPECIFIED CONSTIPATION TYPE: ICD-10-CM

## 2022-09-02 DIAGNOSIS — K59.00 CONSTIPATION, UNSPECIFIED CONSTIPATION TYPE: Primary | ICD-10-CM

## 2022-09-02 PROCEDURE — 74018 RADEX ABDOMEN 1 VIEW: CPT

## 2022-09-02 PROCEDURE — 99211 OFF/OP EST MAY X REQ PHY/QHP: CPT

## 2022-09-02 NOTE — TELEPHONE ENCOUNTER
Patient called again wanting to know if CC could order an abdominal Xray since Carline is not here- [facility] urgent care told her it would be a 3 hours wait and is now at the ER and they said it would be even longer.

## 2022-09-02 NOTE — TELEPHONE ENCOUNTER
Patient called in wanting an appointment today. There was no availability in the whole office. She said for about 13 days now she has been having constipation with abdominal pain. She had a recent bowel movement & she said there was clots on the toilet paper and red in the toilet bowel. She didn't feel like she strained to use the bathroom so she was concerned. She said she had a family history of polyps and colon cancer. She also said that she had a colonoscopy at 16 & they removed something, she wasn't sure what it was. I told her that she should not go the whole weekend without being seen. I told her if she didn't want to go to the ER to start with urgent care. They can access her to see if its an emergency and they have an ability to do an x-ray and check for a blockage. Patient verbalized an understanding.    That being said, she is hoping to get a referral to gastroenterology for a colonoscopy.    Please advise.

## 2022-09-20 DIAGNOSIS — F41.1 GENERALIZED ANXIETY DISORDER: ICD-10-CM

## 2022-09-21 RX ORDER — BUSPIRONE HYDROCHLORIDE 5 MG/1
TABLET ORAL
Qty: 60 TABLET | Refills: 1 | Status: SHIPPED | OUTPATIENT
Start: 2022-09-21 | End: 2023-03-30

## 2022-09-22 ENCOUNTER — HOSPITAL ENCOUNTER (EMERGENCY)
Facility: HOSPITAL | Age: 27
Discharge: HOME | End: 2022-09-22
Attending: EMERGENCY MEDICINE | Admitting: EMERGENCY MEDICINE

## 2022-09-22 VITALS
HEART RATE: 94 BPM | WEIGHT: 137.13 LBS | RESPIRATION RATE: 18 BRPM | OXYGEN SATURATION: 98 % | DIASTOLIC BLOOD PRESSURE: 88 MMHG | SYSTOLIC BLOOD PRESSURE: 133 MMHG | BODY MASS INDEX: 22.04 KG/M2 | HEIGHT: 66 IN | TEMPERATURE: 97.9 F

## 2022-09-22 DIAGNOSIS — G43.809 OTHER MIGRAINE WITHOUT STATUS MIGRAINOSUS, NOT INTRACTABLE: Primary | ICD-10-CM

## 2022-09-22 PROCEDURE — 99283 EMERGENCY DEPT VISIT LOW MDM: CPT

## 2022-09-22 PROCEDURE — 25010000002 KETOROLAC TROMETHAMINE PER 15 MG: Performed by: EMERGENCY MEDICINE

## 2022-09-22 PROCEDURE — 96374 THER/PROPH/DIAG INJ IV PUSH: CPT

## 2022-09-22 PROCEDURE — 96375 TX/PRO/DX INJ NEW DRUG ADDON: CPT

## 2022-09-22 PROCEDURE — 25010000002 DIPHENHYDRAMINE PER 50 MG: Performed by: EMERGENCY MEDICINE

## 2022-09-22 PROCEDURE — 25010000002 ONDANSETRON PER 1 MG: Performed by: EMERGENCY MEDICINE

## 2022-09-22 RX ORDER — ONDANSETRON 2 MG/ML
4 INJECTION INTRAMUSCULAR; INTRAVENOUS ONCE
Status: COMPLETED | OUTPATIENT
Start: 2022-09-22 | End: 2022-09-22

## 2022-09-22 RX ORDER — KETOROLAC TROMETHAMINE 30 MG/ML
30 INJECTION, SOLUTION INTRAMUSCULAR; INTRAVENOUS ONCE
Status: COMPLETED | OUTPATIENT
Start: 2022-09-22 | End: 2022-09-22

## 2022-09-22 RX ORDER — SODIUM CHLORIDE 0.9 % (FLUSH) 0.9 %
10 SYRINGE (ML) INJECTION AS NEEDED
Status: DISCONTINUED | OUTPATIENT
Start: 2022-09-22 | End: 2022-09-22 | Stop reason: HOSPADM

## 2022-09-22 RX ORDER — DIPHENHYDRAMINE HYDROCHLORIDE 50 MG/ML
25 INJECTION INTRAMUSCULAR; INTRAVENOUS ONCE
Status: COMPLETED | OUTPATIENT
Start: 2022-09-22 | End: 2022-09-22

## 2022-09-22 RX ADMIN — KETOROLAC TROMETHAMINE 30 MG: 30 INJECTION, SOLUTION INTRAMUSCULAR; INTRAVENOUS at 06:49

## 2022-09-22 RX ADMIN — DIPHENHYDRAMINE HYDROCHLORIDE 25 MG: 50 INJECTION, SOLUTION INTRAMUSCULAR; INTRAVENOUS at 06:50

## 2022-09-22 RX ADMIN — ONDANSETRON 4 MG: 2 INJECTION INTRAMUSCULAR; INTRAVENOUS at 06:49

## 2022-09-22 NOTE — ED PROVIDER NOTES
Time: 6:13 AM EDT  Arrived by: private car  Chief Complaint: Headache  History provided by: Patient  History is limited by: N/A     History of Present Illness:  Patient is a 26 y.o. year old female who presents to the emergency department with complaints of a persistent migraine headache for 6 hours. Pt notes nausea and vomit. Pt states she took Nurtec at around 23:30pm with no relief when it normally works. Pt states she usually goes to Urgent Care or with her PCP and gets IV Toradol but didn't want to wait so she decided to come to the ED. Pt follows up with her neurologist and PCP regularly.     used: No        Similar Symptoms Previously: Yes  Recently seen: No      Patient Care Team  Primary Care Provider: Carline Calhoun APRN    Past Medical History:     No Known Allergies  Past Medical History:   Diagnosis Date   • ADHD    • Anxiety disorder 11/20/2017   • Chronic allergic rhinitis    • H/O bilateral salpingectomy 8/4/2022   • Hypertension    • Hypothyroidism 02/27/2020   • Migraine headache 02/10/2020     Past Surgical History:   Procedure Laterality Date   • APPENDECTOMY     • CHOLECYSTECTOMY     • SALPINGECTOMY Bilateral 8/4/2022    Procedure: SALPINGECTOMY LAPAROSCOPIC;  Surgeon: Morgan Cheng MD;  Location: [facility] MAIN OR;  Service: Gynecology;  Laterality: Bilateral;   • TONSILLECTOMY       Family History   Problem Relation Age of Onset   • Hypertension Mother    • Diabetes Mother         Unspecified type   • Kidney nephrosis Mother    • Hypertension Father    • Diabetes Maternal Grandmother         Unspecified type   • Hypertension Maternal Grandfather    • Diabetes Maternal Grandfather         Unspecified type   • Heart disease Paternal Grandfather    • Prostate cancer Paternal Grandfather    • Colon cancer Paternal Grandfather         70s   • Hypothyroidism Other    • Malig Hyperthermia Neg Hx        Home Medications:  Prior to Admission medications    Medication Sig  "Start Date End Date Taking? Authorizing Provider   busPIRone (BUSPAR) 5 MG tablet TAKE 1 TABLET BY MOUTH TWO TIMES A DAY 9/21/22   Carline Calhoun APRN   EPINEPHrine (EPIPEN) 0.3 MG/0.3ML solution auto-injector injection INJECT 0.3ML INTO THE APPROPRIATE MUSCLE AS DIRECTED FOR ONE DOSE 7/1/21   Madhuri Somers MD   linaclotide (Linzess) 145 MCG capsule capsule Take 1 capsule by mouth Every Morning Before Breakfast. 9/1/22   Carline Calhoun APRN   lisinopril (PRINIVIL,ZESTRIL) 5 MG tablet Take 5 mg by mouth Daily. 8/29/22   Madhuri Somers MD   Nurtec 75 MG dispersible tablet Take 1 tablet by mouth As Needed (migraine). 3/23/22   Carline Calhoun APRN   ondansetron (ZOFRAN) 4 MG tablet Take 1 tablet by mouth Every 8 (Eight) Hours As Needed for Nausea. 8/27/21   Carline Calhoun APRN        Social History:   Social History     Tobacco Use   • Smoking status: Never Smoker   • Smokeless tobacco: Never Used   Vaping Use   • Vaping Use: Never used   Substance Use Topics   • Alcohol use: Yes     Comment: Rarely   • Drug use: Defer         Review of Systems:  Review of Systems   Constitutional: Negative for chills and fever.   HENT: Negative for sore throat.    Eyes: Negative for photophobia.   Respiratory: Negative for shortness of breath.    Cardiovascular: Negative for chest pain.   Gastrointestinal: Positive for nausea and vomiting. Negative for abdominal pain and diarrhea.   Genitourinary: Negative for dysuria.   Musculoskeletal: Negative for neck pain.   Skin: Negative for wound.   Neurological: Positive for headaches.   All other systems reviewed and are negative.       Physical Exam:  /95 (BP Location: Right arm, Patient Position: Sitting)   Pulse 97   Temp 97.9 °F (36.6 °C) (Oral)   Resp 18   Ht 167.6 cm (66\")   Wt 62.2 kg (137 lb 2 oz)   SpO2 100%   BMI 22.13 kg/m²     Physical Exam  Vitals and nursing note reviewed.   Constitutional:       General: She is not in acute distress.  HENT: "      Head: Normocephalic and atraumatic.      Right Ear: Tympanic membrane, ear canal and external ear normal.      Left Ear: Tympanic membrane, ear canal and external ear normal.      Mouth/Throat:      Mouth: Mucous membranes are moist.   Eyes:      Extraocular Movements: Extraocular movements intact.      Pupils: Pupils are equal, round, and reactive to light.   Cardiovascular:      Rate and Rhythm: Normal rate and regular rhythm.   Pulmonary:      Effort: Pulmonary effort is normal. No respiratory distress.      Breath sounds: Normal breath sounds.   Abdominal:      General: Abdomen is flat.      Palpations: Abdomen is soft.      Tenderness: There is no abdominal tenderness.   Musculoskeletal:         General: Normal range of motion.      Cervical back: Normal range of motion and neck supple.   Skin:     General: Skin is warm and dry.      Capillary Refill: Capillary refill takes less than 2 seconds.   Neurological:      General: No focal deficit present.      Mental Status: She is alert and oriented to person, place, and time. Mental status is at baseline.   Psychiatric:         Mood and Affect: Mood normal.         Thought Content: Thought content normal.         Judgment: Judgment normal.                Medications in the Emergency Department:  Medications   sodium chloride 0.9 % flush 10 mL (has no administration in time range)   ketorolac (TORADOL) injection 30 mg (30 mg Intravenous Given 9/22/22 0649)   ondansetron (ZOFRAN) injection 4 mg (4 mg Intravenous Given 9/22/22 0649)   diphenhydrAMINE (BENADRYL) injection 25 mg (25 mg Intravenous Given 9/22/22 0650)        Labs  Lab Results (last 24 hours)     ** No results found for the last 24 hours. **           Imaging:  No Radiology Exams Resulted Within Past 24 Hours    Procedures:  Procedures    Progress                            Medical Decision Making:  MDM   26-year-old female patient with history of chronic migraines currently sees a neurologist and  takes migraine medication presents after her migraine medication failed to improve her headache.  She states her headache is similar to prior migraines.  Patient was given IV medications and her headache and nausea resolved.  Patient is stable for discharge with outpatient follow-up.      Final diagnoses:   Other migraine without status migrainosus, not intractable        Disposition:  ED Disposition     ED Disposition   Discharge    Condition   Stable    Comment   --             This medical record created using voice recognition software and a virtual scribe.    Documentation assistance provided by Debbie Graham acting as scribe for Marco Canales DO. Information recorded by the scribe was done at my direction and has been verified and validated by me.          Debbie Graham  09/22/22 0632       Marco Canales DO  09/22/22 0802

## 2022-12-20 ENCOUNTER — TELEPHONE (OUTPATIENT)
Dept: FAMILY MEDICINE CLINIC | Facility: CLINIC | Age: 27
End: 2022-12-20

## 2022-12-20 RX ORDER — VILOXAZINE HYDROCHLORIDE 200 MG/1
1 CAPSULE, EXTENDED RELEASE ORAL DAILY
COMMUNITY
Start: 2022-11-23 | End: 2022-12-20 | Stop reason: SDUPTHER

## 2022-12-20 RX ORDER — VILOXAZINE HYDROCHLORIDE 200 MG/1
1 CAPSULE, EXTENDED RELEASE ORAL DAILY
Qty: 30 CAPSULE | Refills: 2 | Status: SHIPPED | OUTPATIENT
Start: 2022-12-20 | End: 2023-03-02 | Stop reason: SDUPTHER

## 2023-02-13 RX ORDER — LISINOPRIL 5 MG/1
TABLET ORAL
Qty: 30 TABLET | Refills: 1 | Status: SHIPPED | OUTPATIENT
Start: 2023-02-13

## 2023-03-02 ENCOUNTER — OFFICE VISIT (OUTPATIENT)
Dept: FAMILY MEDICINE CLINIC | Facility: CLINIC | Age: 28
End: 2023-03-02
Payer: COMMERCIAL

## 2023-03-02 VITALS
SYSTOLIC BLOOD PRESSURE: 122 MMHG | DIASTOLIC BLOOD PRESSURE: 81 MMHG | BODY MASS INDEX: 20.82 KG/M2 | WEIGHT: 129 LBS | HEART RATE: 86 BPM | OXYGEN SATURATION: 100 % | TEMPERATURE: 98 F

## 2023-03-02 DIAGNOSIS — Z13.220 LIPID SCREENING: ICD-10-CM

## 2023-03-02 DIAGNOSIS — F90.2 ATTENTION DEFICIT HYPERACTIVITY DISORDER (ADHD), COMBINED TYPE: Primary | ICD-10-CM

## 2023-03-02 DIAGNOSIS — Z11.59 NEED FOR HEPATITIS C SCREENING TEST: ICD-10-CM

## 2023-03-02 DIAGNOSIS — E03.8 HYPOTHYROIDISM DUE TO HASHIMOTO'S THYROIDITIS: ICD-10-CM

## 2023-03-02 DIAGNOSIS — I10 PRIMARY HYPERTENSION: ICD-10-CM

## 2023-03-02 DIAGNOSIS — E06.3 HYPOTHYROIDISM DUE TO HASHIMOTO'S THYROIDITIS: ICD-10-CM

## 2023-03-02 LAB
ALBUMIN SERPL-MCNC: 4.5 G/DL (ref 3.5–5.2)
ALBUMIN/GLOB SERPL: 1.5 G/DL
ALP SERPL-CCNC: 68 U/L (ref 39–117)
ALT SERPL W P-5'-P-CCNC: 7 U/L (ref 1–33)
ANION GAP SERPL CALCULATED.3IONS-SCNC: 8 MMOL/L (ref 5–15)
AST SERPL-CCNC: 12 U/L (ref 1–32)
BILIRUB SERPL-MCNC: 0.2 MG/DL (ref 0–1.2)
BUN SERPL-MCNC: 10 MG/DL (ref 6–20)
BUN/CREAT SERPL: 13.2 (ref 7–25)
CALCIUM SPEC-SCNC: 9.5 MG/DL (ref 8.6–10.5)
CHLORIDE SERPL-SCNC: 105 MMOL/L (ref 98–107)
CHOLEST SERPL-MCNC: 112 MG/DL (ref 0–200)
CO2 SERPL-SCNC: 27 MMOL/L (ref 22–29)
CREAT SERPL-MCNC: 0.76 MG/DL (ref 0.57–1)
DEPRECATED RDW RBC AUTO: 37.9 FL (ref 37–54)
EGFRCR SERPLBLD CKD-EPI 2021: 110.3 ML/MIN/1.73
ERYTHROCYTE [DISTWIDTH] IN BLOOD BY AUTOMATED COUNT: 11.6 % (ref 12.3–15.4)
GLOBULIN UR ELPH-MCNC: 3.1 GM/DL
GLUCOSE SERPL-MCNC: 80 MG/DL (ref 65–99)
HCT VFR BLD AUTO: 38.5 % (ref 34–46.6)
HCV AB SER DONR QL: NORMAL
HDLC SERPL-MCNC: 40 MG/DL (ref 40–60)
HGB BLD-MCNC: 13 G/DL (ref 12–15.9)
LDLC SERPL CALC-MCNC: 59 MG/DL (ref 0–100)
LDLC/HDLC SERPL: 1.53 {RATIO}
MCH RBC QN AUTO: 29.3 PG (ref 26.6–33)
MCHC RBC AUTO-ENTMCNC: 33.8 G/DL (ref 31.5–35.7)
MCV RBC AUTO: 86.9 FL (ref 79–97)
PLATELET # BLD AUTO: 291 10*3/MM3 (ref 140–450)
PMV BLD AUTO: 9.8 FL (ref 6–12)
POTASSIUM SERPL-SCNC: 4.3 MMOL/L (ref 3.5–5.2)
PROT SERPL-MCNC: 7.6 G/DL (ref 6–8.5)
RBC # BLD AUTO: 4.43 10*6/MM3 (ref 3.77–5.28)
SODIUM SERPL-SCNC: 140 MMOL/L (ref 136–145)
TRIGL SERPL-MCNC: 55 MG/DL (ref 0–150)
TSH SERPL DL<=0.05 MIU/L-ACNC: 1.29 UIU/ML (ref 0.27–4.2)
VLDLC SERPL-MCNC: 13 MG/DL (ref 5–40)
WBC NRBC COR # BLD: 5.4 10*3/MM3 (ref 3.4–10.8)

## 2023-03-02 PROCEDURE — 99395 PREV VISIT EST AGE 18-39: CPT | Performed by: NURSE PRACTITIONER

## 2023-03-02 PROCEDURE — 86803 HEPATITIS C AB TEST: CPT | Performed by: NURSE PRACTITIONER

## 2023-03-02 PROCEDURE — 80061 LIPID PANEL: CPT | Performed by: NURSE PRACTITIONER

## 2023-03-02 PROCEDURE — 36415 COLL VENOUS BLD VENIPUNCTURE: CPT | Performed by: NURSE PRACTITIONER

## 2023-03-02 PROCEDURE — 80050 GENERAL HEALTH PANEL: CPT | Performed by: NURSE PRACTITIONER

## 2023-03-02 RX ORDER — VILOXAZINE HYDROCHLORIDE 200 MG/1
1 CAPSULE, EXTENDED RELEASE ORAL DAILY
Qty: 30 CAPSULE | Refills: 5 | Status: SHIPPED | OUTPATIENT
Start: 2023-03-02

## 2023-03-03 ENCOUNTER — TRANSCRIBE ORDERS (OUTPATIENT)
Dept: PHYSICAL THERAPY | Facility: CLINIC | Age: 28
End: 2023-03-03
Payer: COMMERCIAL

## 2023-03-03 DIAGNOSIS — S46.911A STRAIN OF RIGHT SHOULDER, INITIAL ENCOUNTER: Primary | ICD-10-CM

## 2023-03-07 ENCOUNTER — TELEPHONE (OUTPATIENT)
Dept: PHYSICAL THERAPY | Facility: CLINIC | Age: 28
End: 2023-03-07

## 2023-03-07 NOTE — TELEPHONE ENCOUNTER
Caller: Alicai Rodriguez    Relationship: Self         What was the call regarding: DAUGHTER HAS COVID

## 2023-03-18 DIAGNOSIS — G43.009 MIGRAINE WITHOUT AURA AND WITHOUT STATUS MIGRAINOSUS, NOT INTRACTABLE: ICD-10-CM

## 2023-03-20 RX ORDER — RIMEGEPANT SULFATE 75 MG/75MG
TABLET, ORALLY DISINTEGRATING ORAL
Qty: 8 TABLET | Refills: 2 | Status: SHIPPED | OUTPATIENT
Start: 2023-03-20

## 2023-03-24 ENCOUNTER — TREATMENT (OUTPATIENT)
Dept: PHYSICAL THERAPY | Facility: CLINIC | Age: 28
End: 2023-03-24
Payer: OTHER MISCELLANEOUS

## 2023-03-24 DIAGNOSIS — M25.611 DECREASED RANGE OF MOTION OF RIGHT SHOULDER: ICD-10-CM

## 2023-03-24 DIAGNOSIS — R29.898 WEAKNESS OF RIGHT UPPER EXTREMITY: ICD-10-CM

## 2023-03-24 DIAGNOSIS — S46.911D STRAIN OF RIGHT SHOULDER, SUBSEQUENT ENCOUNTER: Primary | ICD-10-CM

## 2023-03-24 DIAGNOSIS — M25.511 ACUTE PAIN OF RIGHT SHOULDER: ICD-10-CM

## 2023-03-24 PROCEDURE — 97161 PT EVAL LOW COMPLEX 20 MIN: CPT | Performed by: PHYSICAL THERAPIST
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
## 2023-03-28 ENCOUNTER — TREATMENT (OUTPATIENT)
Dept: PHYSICAL THERAPY | Facility: CLINIC | Age: 28
End: 2023-03-28
Payer: OTHER MISCELLANEOUS

## 2023-03-28 DIAGNOSIS — R29.898 WEAKNESS OF RIGHT UPPER EXTREMITY: ICD-10-CM

## 2023-03-28 DIAGNOSIS — M25.511 ACUTE PAIN OF RIGHT SHOULDER: ICD-10-CM

## 2023-03-28 DIAGNOSIS — M25.611 DECREASED RANGE OF MOTION OF RIGHT SHOULDER: ICD-10-CM

## 2023-03-28 DIAGNOSIS — S46.911D STRAIN OF RIGHT SHOULDER, SUBSEQUENT ENCOUNTER: Primary | ICD-10-CM

## 2023-03-28 PROCEDURE — 97140 MANUAL THERAPY 1/> REGIONS: CPT | Performed by: PHYSICAL THERAPIST

## 2023-03-28 PROCEDURE — 97110 THERAPEUTIC EXERCISES: CPT | Performed by: PHYSICAL THERAPIST

## 2023-03-28 PROCEDURE — 97112 NEUROMUSCULAR REEDUCATION: CPT | Performed by: PHYSICAL THERAPIST

## 2023-03-28 NOTE — PROGRESS NOTES
Physical Therapy Daily Note  [address] [place] [address]    VISIT#: 2    Subjective   Alicia Rodriguez reports she has been compliant with HEP.       Objective     See Exercise, Manual, and Modality Logs for complete treatment.     Assessment/Plan    Progressed all of patient's strengthening exercises and patient tolerated well with no reports of increase in pain. Pt was challenged with SA wall slides and wall clocks due to shoulder weakness. Will continue to progress patient as able.     Progress per Plan of Care and Progress strengthening /stabilization /functional activity            Timed:                 Manual Therapy:    8     mins  29125;     Therapeutic Exercise:    15     mins  69343;     Neuromuscular Quentin:    8    mins  43506;    Therapeutic Activity:     0     mins  70874;     Gait Trainin     mins  85299;     Ultrasound:     0     mins  25405;    Ionto                               0    mins   57057  Self pay                         0     mins [email or web address]    Un-Timed:  Electrical Stimulation:    0     mins  13363 ( )  Canalith Repos    0     mins 13069  Dry Needling     0     mins self-pay  Traction     0     mins 61350    Timed Treatment:   31   mins   Total Treatment:     31   mins    Teodoro Callahan PT  Physical Therapist    PT SIGNATURE: Electronically signed by Teodoro Callahan PT  [place] LICENSE: [id number]

## 2023-03-30 DIAGNOSIS — F41.1 GENERALIZED ANXIETY DISORDER: ICD-10-CM

## 2023-03-30 RX ORDER — BUSPIRONE HYDROCHLORIDE 5 MG/1
TABLET ORAL
Qty: 60 TABLET | Refills: 1 | Status: SHIPPED | OUTPATIENT
Start: 2023-03-30

## 2023-04-03 ENCOUNTER — TREATMENT (OUTPATIENT)
Dept: PHYSICAL THERAPY | Facility: CLINIC | Age: 28
End: 2023-04-03
Payer: OTHER MISCELLANEOUS

## 2023-04-03 DIAGNOSIS — M25.611 DECREASED RANGE OF MOTION OF RIGHT SHOULDER: ICD-10-CM

## 2023-04-03 DIAGNOSIS — S46.911D STRAIN OF RIGHT SHOULDER, SUBSEQUENT ENCOUNTER: Primary | ICD-10-CM

## 2023-04-03 DIAGNOSIS — M25.511 ACUTE PAIN OF RIGHT SHOULDER: ICD-10-CM

## 2023-04-03 DIAGNOSIS — R29.898 WEAKNESS OF RIGHT UPPER EXTREMITY: ICD-10-CM

## 2023-04-03 PROCEDURE — 97112 NEUROMUSCULAR REEDUCATION: CPT

## 2023-04-03 PROCEDURE — 97530 THERAPEUTIC ACTIVITIES: CPT

## 2023-04-03 PROCEDURE — 97110 THERAPEUTIC EXERCISES: CPT

## 2023-04-03 NOTE — PROGRESS NOTES
Outpatient Physical Therapy  [address] [facility], [place] [address]                            Physical Therapy Daily Treatment Note    Patient: Alicia Rodriguez   : 1995  Diagnosis/ICD-10 Code:  Strain of right shoulder, subsequent encounter [S46.911D]  Referring practitioner: Yrn Del Cid PA-C  Date of Initial Visit: Type: THERAPY  Noted: 3/24/2023  Today's Date: 4/3/2023  Patient seen for 3 sessions             Subjective   Alicia Rodriguez reports: overall pain has gotten much better.     Pain: 0/10 pain, currently    Objective   No complaints of increased pain or discomfort.    See Exercise, Manual, and Modality Logs for complete treatment.     Assessment/Plan  Alicia progressing as evident by decreased overall shoulder pain. Pt tolerated exercises well, no complaints of increased pain or discomfort. Pt would benefit from skilled PT to address Range of Motion  and Strength deficits, pain management and any concerns with ADLs.         Progress per Plan of Care         Timed:  Manual Therapy:         mins  19108;  Therapeutic Exercise:    10     mins  46645;     Neuromuscular Quentin:    10    mins  28829;    Therapeutic Activity:     10     mins  71989;     Gait Training:           mins  73189;    Aquatic Therapy:          mins  68669;       Untimed:  Electrical Stimulation:         mins  18753 ( );  Mechanical Traction:         mins  37335;       Timed Treatment:   30   mins   Total Treatment:     30   mins      Electronically signed:   Francie Edgar PTA  Physical Therapist Assistant  [facility] License #: [id number]

## 2023-04-05 ENCOUNTER — TELEPHONE (OUTPATIENT)
Dept: PHYSICAL THERAPY | Facility: OTHER | Age: 28
End: 2023-04-05

## 2023-04-05 ENCOUNTER — OFFICE VISIT (OUTPATIENT)
Dept: FAMILY MEDICINE CLINIC | Facility: CLINIC | Age: 28
End: 2023-04-05
Payer: COMMERCIAL

## 2023-04-05 VITALS
SYSTOLIC BLOOD PRESSURE: 127 MMHG | DIASTOLIC BLOOD PRESSURE: 86 MMHG | OXYGEN SATURATION: 100 % | HEART RATE: 91 BPM | BODY MASS INDEX: 19.92 KG/M2 | WEIGHT: 123.4 LBS | TEMPERATURE: 98.2 F

## 2023-04-05 DIAGNOSIS — R11.0 NAUSEA: ICD-10-CM

## 2023-04-05 DIAGNOSIS — R63.4 WEIGHT LOSS: ICD-10-CM

## 2023-04-05 DIAGNOSIS — K52.9 GASTROENTERITIS: Primary | ICD-10-CM

## 2023-04-05 LAB
BILIRUB BLD-MCNC: NEGATIVE MG/DL
CLARITY, POC: CLEAR
COLOR UR: YELLOW
EXPIRATION DATE: NORMAL
GLUCOSE UR STRIP-MCNC: NEGATIVE MG/DL
KETONES UR QL: NEGATIVE
LEUKOCYTE EST, POC: NEGATIVE
Lab: NORMAL
NITRITE UR-MCNC: NEGATIVE MG/ML
PH UR: 6 [PH] (ref 5–8)
PROT UR STRIP-MCNC: NEGATIVE MG/DL
RBC # UR STRIP: NEGATIVE /UL
SP GR UR: 1.01 (ref 1–1.03)
UROBILINOGEN UR QL: NORMAL

## 2023-04-05 NOTE — PROGRESS NOTES
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 Denies any vomiting or diarrhea.     Denies any urinary symptoms. Pt has normal urine output.        Review of Systems   Constitutional: Negative for fever.   Respiratory: Negative for cough.    Gastrointestinal: Positive for abdominal pain and nausea. Negative for diarrhea.   Musculoskeletal: Negative for myalgias.          Medical History: has a past medical history of ADHD, Anxiety disorder (11/20/2017), Chronic allergic rhinitis, H/O bilateral salpingectomy (08/04/2022), Hypertension, Hypothyroidism (02/27/2020), and Migraine headache (02/10/2020).     Surgical History: has a past surgical history that includes Appendectomy; Cholecystectomy; Tonsillectomy; and Salpingectomy (Bilateral, 8/4/2022).     Family History: family history includes Colon cancer in her paternal grandfather; Diabetes in her maternal grandfather, maternal grandmother, and mother; Heart disease in her paternal grandfather; Hypertension in her father, maternal grandfather, and mother; Hypothyroidism in an other family member; Kidney nephrosis in her mother; Prostate cancer in her paternal grandfather.     Social History: reports that she has never smoked. She has never used smokeless tobacco. She reports current alcohol use. Drug use questions deferred to the physician.    Allergies: Patient has no known allergies.      Health Maintenance Due   Topic Date Due   • COVID-19 Vaccine (3 - Booster for Pfizer series) 06/03/2022            Current Outpatient Medications:   •  busPIRone (BUSPAR) 5 MG tablet, TAKE 1 TABLET BY MOUTH TWO TIMES A DAY, Disp: 60 tablet, Rfl: 1  •  linaclotide (Linzess) 145 MCG capsule capsule, Take 1 capsule by mouth Every Morning  Before Breakfast., Disp: 30 capsule, Rfl: 2  •  lisinopril (PRINIVIL,ZESTRIL) 5 MG tablet, TAKE 1 TABLET BY MOUTH DAILY, Disp: 30 tablet, Rfl: 1  •  Nurtec 75 MG dispersible tablet, TAKE 1 TABLET BY MOUTH AS NEEDED FOR MIGRAINE, Disp: 8 tablet, Rfl: 2  •  Qelbree 200 MG capsule sustained-release 24 hr, Take 1 capsule by mouth Daily., Disp: 30 capsule, Rfl: 5  •  EPINEPHrine (EPIPEN) 0.3 MG/0.3ML solution auto-injector injection, INJECT 0.3ML INTO THE APPROPRIATE MUSCLE AS DIRECTED FOR ONE DOSE (Patient not taking: Reported on 4/5/2023), Disp: , Rfl:   •  ondansetron (ZOFRAN) 4 MG tablet, Take 1 tablet by mouth Every 8 (Eight) Hours As Needed for Nausea. (Patient not taking: Reported on 4/5/2023), Disp: 10 tablet, Rfl: 0      Immunization History   Administered Date(s) Administered   • Covid-19 (Pfizer) Gray Cap 03/03/2022, 04/08/2022   • DTaP 02/02/1996, 04/02/1996, 06/04/1996, 06/17/1997, 03/12/2001   • Flu Vaccine Quad PF >36MO 11/20/2017   • Fluzone Quad >6mos (Multi-dose) 10/17/2013   • HPV Quadrivalent 11/13/2008, 06/22/2009, 10/26/2009   • Hep B, Adolescent or Pediatric 1995, 02/02/1996, 06/11/1996   • Hepatitis A 04/21/2018   • Hib (PRP-OMP) 02/02/1996, 04/02/1996, 06/11/1996   • IPV 03/12/2001   • Influenza LAIV (Nasal) 11/13/2008, 12/09/2010   • Influenza TIV (IM) 10/26/2009   • MMR 03/12/1997, 03/12/2001   • Meningococcal MCV4P (Menactra) 10/23/2007   • OPV 02/02/1996, 04/02/1996, 06/11/1996   • Tdap 10/03/2006, 11/29/2016   • Varicella 11/26/1996, 10/03/2006         Objective       Vitals:    04/05/23 0950   BP: 127/86   Pulse: 91   Temp: 98.2 °F (36.8 °C)   TempSrc: Temporal   SpO2: 100%   Weight: 56 kg (123 lb 6.4 oz)      Body mass index is 19.92 kg/m².   Wt Readings from Last 3 Encounters:   04/05/23 56 kg (123 lb 6.4 oz)   03/02/23 58.5 kg (129 lb)   02/16/23 62.1 kg (137 lb)      BP Readings from Last 3 Encounters:   04/05/23 127/86   03/02/23 122/81   02/16/23 120/88      Vitals:    04/05/23  1043 04/05/23 1044 04/05/23 1046   Orthostatic BP: 114/73 111/82 122/86   Orthostatic Pulse: 73 81 78   Patient Position: Lying Sitting Standing         Physical Exam  Vitals reviewed.   Constitutional:       Appearance: Normal appearance. She is well-developed.   HENT:      Head: Normocephalic and atraumatic.   Eyes:      Conjunctiva/sclera: Conjunctivae normal.      Pupils: Pupils are equal, round, and reactive to light.   Cardiovascular:      Rate and Rhythm: Normal rate and regular rhythm.      Heart sounds: Normal heart sounds. No murmur heard.  Pulmonary:      Effort: Pulmonary effort is normal.      Breath sounds: Normal breath sounds. No wheezing or rhonchi.   Abdominal:      General: Bowel sounds are normal. There is no distension.      Palpations: Abdomen is soft.      Tenderness: There is no abdominal tenderness.   Skin:     General: Skin is warm and dry.   Neurological:      Mental Status: She is alert and oriented to person, place, and time.   Psychiatric:         Mood and Affect: Mood and affect normal.         Behavior: Behavior normal.         Thought Content: Thought content normal.         Judgment: Judgment normal.             Result Review :       Common labs    Common Labs 6/20/22 6/20/22 6/20/22 8/4/22 3/2/23 3/2/23 3/2/23    1622 1622 1622  1246 1246 1246   Glucose  91     80   BUN  13     10   Creatinine  0.75     0.76   Sodium  138     140   Potassium  4.1     4.3   Chloride  102     105   Calcium  9.2     9.5   Albumin  4.70     4.5   Total Bilirubin  0.3     0.2   Alkaline Phosphatase  60     68   AST (SGOT)  14     12   ALT (SGPT)  8     7   WBC   7.10 7.51 5.40     Hemoglobin   13.3 13.4 13.0     Hematocrit   41.5 38.8 38.5     Platelets   293 234 291     Total Cholesterol 132     112    Triglycerides 109     55    HDL Cholesterol 39 (A)     40    LDL Cholesterol  73     59    (A) Abnormal value                           Assessment and Plan        Diagnoses and all orders for this  visit:    1. Gastroenteritis (Primary)  Comments:  Push fluids, monitor output. take zofran prn nausea. call office for worsening.     2. Nausea  -     POC Urinalysis Dipstick, Automated    3. Weight loss  Comments:  increae protien and track calories. if adequate calories then follow up for further evaluation.             Follow Up     Return if symptoms worsen or fail to improve.    Patient was given instructions and counseling regarding her condition or for health maintenance advice. Please see specific information pulled into the AVS if appropriate.     ROBERTO CARLOS Carnes

## 2023-04-06 ENCOUNTER — TREATMENT (OUTPATIENT)
Dept: PHYSICAL THERAPY | Facility: CLINIC | Age: 28
End: 2023-04-06
Payer: OTHER MISCELLANEOUS

## 2023-04-06 DIAGNOSIS — S46.911D STRAIN OF RIGHT SHOULDER, SUBSEQUENT ENCOUNTER: Primary | ICD-10-CM

## 2023-04-06 DIAGNOSIS — M25.511 ACUTE PAIN OF RIGHT SHOULDER: ICD-10-CM

## 2023-04-06 DIAGNOSIS — M25.611 DECREASED RANGE OF MOTION OF RIGHT SHOULDER: ICD-10-CM

## 2023-04-06 DIAGNOSIS — R29.898 WEAKNESS OF RIGHT UPPER EXTREMITY: ICD-10-CM

## 2023-04-06 PROCEDURE — 97110 THERAPEUTIC EXERCISES: CPT | Performed by: PHYSICAL THERAPIST

## 2023-04-06 NOTE — PROGRESS NOTES
Progress Assessment  [address] [place] [address]        Patient: Alicia Rodriguez   : 1995  Diagnosis/ICD-10 Code:  Strain of right shoulder, subsequent encounter [S46.911D]  Referring practitioner: Yrn Del Cid PA-C  Date of Initial Visit: Type: THERAPY  Noted: 3/24/2023  Today's Date: 2023  Patient seen for 4 sessions      Subjective:   Alicia Rodriguez reports: 0/10  Subjective Questionnaire: QuickDASH: 1455 - 1-19%  Clinical Progress: improved  Home Program Compliance: Yes  Treatment has included: therapeutic exercise, neuromuscular re-education, manual therapy and therapeutic activity    Subjective Pt reports her shoulder is much better since starting therapy. Pt reports she feels ready to discharge and will continue exercising on her own.   Objective     Left Shoulder      Planes of Motion   Flexion: 5   Abduction: 5   External rotation at 0°: 5   Internal rotation at 0°: 5      Isolated Muscles   Biceps: 5   Lower trapezius: 4+   Middle trapezius: 4+   Rhomboids: 4+   Triceps: 5     Right Shoulder      Planes of Motion   Flexion: 5  Abduction: 5  External rotation at 0°: 5   Internal rotation at 0°: 5     Isolated Muscles   Biceps: 5   Lower trapezius: 4+  Middle trapezius: 4+  Rhomboids: 4+  Triceps: 5     Pt has full range of R shoulder in all planes.       Assessment/Plan     Pt has met all strength and ROM goals set for R shoulder. Pt does still have slight weakness in scapular stabilizers as listed above, discussed with patient the importance of continuing exercises to improve scapular stabilizer strength. Pt has also made improvement with tolerance to functional mobility as demonstrated by improvement in QuickDASH score. Due to patient meeting all goals, will be discharging patient as this time. Discussed with patient to continue exercises at home in order to maintain strength.     Plan Goals: SHOULDER PROBLEMS:    1. The patient has limited ROM of the right shoulder.    LTG 1: 12  weeks: The patient will demonstrate 180 degrees of right shoulder flexion, 180 degrees of shoulder abduction, 75 degrees of shoulder external rotation, and shoulder internal rotation to T2 with no increase in pain to allow the patient to reach into upper kitchen cabinets and manipulate clothing behind the back with greater ease.    STATUS: MET    2. The patient has limited strength of the right shoulder.    LTG 2: 12 weeks: The patient will demonstrate 5/5 strength for right shoulder flexion, abduction, external rotation, and internal rotation in order to demonstrate improved shoulder stability.    STATUS: MET      STG 2a: 6 weeks: The patient will demonstrate 4+/5 strength for right shoulder flexion, abduction, external rotation, and internal rotation.    STATUS: MET      STG2b: 6 weeks: The patient will be independent with home exercises.    STATUS: MET    3. The patient complains of pain to the right shoulder.    LTG 3: 12 weeks: The patient will report a pain rating of 0/10 or better in order to improve sleep quality and tolerance to performance of activities of daily living.    STATUS: MET    STG 3a: 6 weeks: The patient will report a pain rating of 3/10 or better.    STATUS: MET    Carrying, Moving, and Handling Objects Functional Limitation    LTG 4: 12 weeks: The patient will demonstrate 1-19% limitation by achieving a score of 15 on the Quick DASH.    STATUS: MET    STG 4a: 6 weeks: The patient will demonstrate 20-39% limitation by achieving a score of 25 on the Quick DASH.    STATUS: MET      Progress toward previous goals: All Met    See Exercise, Manual, and Modality Logs for complete treatment.         Recommendations: Discharge      PT SIGNATURE: Electronically signed by Teodoro Callahan PT  [place] LICENSE: [id number]    Based upon review of the patient's progress and continued therapy plan, it is my medical opinion that Alicia Rodriguez [facility]  [facility] [place]  [facility]  [address] [place] [address] [place] [address]  [phone].      Timed:                 Manual Therapy:    0     mins  76135;     Therapeutic Exercise:    24     mins  90479;     Neuromuscular Quentin:    0    mins  38548;    Therapeutic Activity:     0     mins  82393;     Gait Trainin     mins  48804;     Ultrasound:     0     mins  68844;    Ionto                               0    mins   76624  Self pay                         0     mins [email or web address]    Un-Timed:  Electrical Stimulation:    0     mins  62909 ( )  Canalith Repos    0     mins 04929  Dry Needling     0     mins self-pay  Traction     0     mins 04846    Timed Treatment:   24   mins   Total Treatment:     24   mins      I certify that the therapy services are furnished while this patient is under my care.  The services outlined above are required by this patient, and will be reviewed every 90 days.

## 2023-04-19 RX ORDER — LISINOPRIL 5 MG/1
TABLET ORAL
Qty: 30 TABLET | Refills: 0 | Status: SHIPPED | OUTPATIENT
Start: 2023-04-19

## 2023-04-25 NOTE — PROGRESS NOTES
Chief Complaint  ADHD (Follow up)    Subjective          Alicia Rodriguez is a 27 y.o. female who presents to [facility]    History of Present Illness    ADHD pt is doing well. Pt is focusing well. Pt is doing task completion. Pt denies any medication se. Buspar is controlling anxiety.     Weight loss - pt denies any fever, cough or bowel changes. Pt is eating 1800 calories per day.     Migraines are improved with med. Pt takes Nurtec with relief most of the time. Pt will need additional treatment is about 2 times per year.     Htn - well controlled. Pt is compliant with med.     Constipation resolved with Linzess pt reports having normal bowel movements.     Pt awakens jittery. Recommend pt to try Buspar at night to see if that helps.     PHQ-2 Total Score: 0   PHQ-9 Total Score: 0        Review of Systems   Constitutional:  Negative for chills, fatigue and fever.   Respiratory:  Negative for cough and shortness of breath.    Cardiovascular:  Negative for chest pain and palpitations.   Gastrointestinal:  Negative for constipation, diarrhea, nausea and vomiting.   Musculoskeletal:  Negative for back pain and neck pain.   Skin:  Negative for rash.   Neurological:  Positive for headaches. Negative for dizziness.   Psychiatric/Behavioral:  Positive for decreased concentration. Negative for dysphoric mood and sleep disturbance. The patient is not nervous/anxious.         Medical History: has a past medical history of ADHD, Anxiety disorder (11/20/2017), Chronic allergic rhinitis, H/O bilateral salpingectomy (08/04/2022), Hypertension, Hypothyroidism (02/27/2020), and Migraine headache (02/10/2020).     Surgical History: has a past surgical history that includes Appendectomy; Cholecystectomy; Tonsillectomy; and Salpingectomy (Bilateral, 8/4/2022).     Family History: family history includes Colon cancer in her paternal grandfather; Diabetes in her maternal grandfather, maternal  grandmother, and mother; Heart disease in her paternal grandfather; Hypertension in her father, maternal grandfather, and mother; Hypothyroidism in an other family member; Kidney nephrosis in her mother; Prostate cancer in her paternal grandfather.     Social History: reports that she has never smoked. She has never used smokeless tobacco. She reports current alcohol use. Drug use questions deferred to the physician.    Allergies: Patient has no known allergies.      Health Maintenance Due   Topic Date Due    COVID-19 Vaccine (3 - Pfizer series) 06/03/2022            Current Outpatient Medications:     busPIRone (BUSPAR) 5 MG tablet, Take 1 tablet by mouth 2 (Two) Times a Day., Disp: 180 tablet, Rfl: 1    linaclotide (Linzess) 145 MCG capsule capsule, Take 1 capsule by mouth Every Morning Before Breakfast., Disp: 30 capsule, Rfl: 2    lisinopril (PRINIVIL,ZESTRIL) 5 MG tablet, TAKE 1 TABLET BY MOUTH EVERY DAY, Disp: 30 tablet, Rfl: 2    Nurtec 75 MG dispersible tablet, TAKE 1 TABLET BY MOUTH AS NEEDED FOR MIGRAINE, Disp: 8 tablet, Rfl: 2    ondansetron (ZOFRAN) 4 MG tablet, Take 1 tablet by mouth Every 8 (Eight) Hours As Needed for Nausea., Disp: 10 tablet, Rfl: 0    Qelbree 200 MG capsule sustained-release 24 hr, Take 1 capsule by mouth Daily., Disp: 30 capsule, Rfl: 5    Blood Glucose Monitoring Suppl (OneTouch Verio) w/Device kit, 1 each 1 (One) Time for 1 dose., Disp: 1 kit, Rfl: 0    EPINEPHrine (EPIPEN) 0.3 MG/0.3ML solution auto-injector injection, , Disp: , Rfl:     glucose blood test strip, Use as instructed Dx: DM E11, Disp: 100 each, Rfl: 3    ONE TOUCH CLUB LANCETS misc, 90 each 3 (Three) Times a Day As Needed (check blood sugar)., Disp: 100 each, Rfl: 3    Current Facility-Administered Medications:     promethazine (PHENERGAN) injection 12.5 mg, 12.5 mg, Intramuscular, Q6H PRN, Thomas Israel, , 12.5 mg at 07/11/23 1447      Immunization History   Administered Date(s) Administered    Covid-19  (Pfizer) Gray Cap Monovalent 03/03/2022, 04/08/2022    DTaP 02/02/1996, 04/02/1996, 06/04/1996, 06/17/1997, 03/12/2001    Flu Vaccine Quad PF >36MO 11/20/2017    Fluzone Quad >6mos (Multi-dose) 10/17/2013    HPV Quadrivalent 11/13/2008, 06/22/2009, 10/26/2009    Hep B, Adolescent or Pediatric 1995, 02/02/1996, 06/11/1996    Hepatitis A 04/21/2018    Hib (PRP-OMP) 02/02/1996, 04/02/1996, 06/11/1996    IPV 03/12/2001    Influenza LAIV (Nasal) 11/13/2008, 12/09/2010    Influenza TIV (IM) 10/26/2009    MMR 03/12/1997, 03/12/2001    Meningococcal MCV4P (Menactra) 10/23/2007    OPV 02/02/1996, 04/02/1996, 06/11/1996    Tdap 10/03/2006, 11/29/2016    Varicella 11/26/1996, 10/03/2006         Objective       Vitals:    08/24/23 0844   BP: 109/75   Pulse: 89   Temp: 98 øF (36.7 øC)   TempSrc: Temporal   SpO2: 98%   Weight: 53.7 kg (118 lb 6.4 oz)      Body mass index is 19.11 kg/mý.   Wt Readings from Last 3 Encounters:   08/24/23 53.7 kg (118 lb 6.4 oz)   07/11/23 54.9 kg (121 lb)   04/05/23 56 kg (123 lb 6.4 oz)      BP Readings from Last 3 Encounters:   08/24/23 109/75   08/21/23 135/87   07/11/23 117/78        Physical Exam  Vitals reviewed.   Constitutional:       Appearance: Normal appearance. She is well-developed.   HENT:      Head: Normocephalic and atraumatic.   Eyes:      Conjunctiva/sclera: Conjunctivae normal.      Pupils: Pupils are equal, round, and reactive to light.   Cardiovascular:      Rate and Rhythm: Normal rate and regular rhythm.      Heart sounds: Normal heart sounds. No murmur heard.  Pulmonary:      Effort: Pulmonary effort is normal.      Breath sounds: Normal breath sounds. No wheezing or rhonchi.   Abdominal:      General: Bowel sounds are normal. There is no distension.      Palpations: Abdomen is soft.      Tenderness: There is no abdominal tenderness.   Skin:     General: Skin is warm and dry.   Neurological:      Mental Status: She is alert and oriented to person, place, and time.    Psychiatric:         Mood and Affect: Mood and affect normal.         Behavior: Behavior normal.         Thought Content: Thought content normal.         Judgment: Judgment normal.           Result Review :       Common labs          3/2/2023    12:46   Common Labs   Glucose 80    BUN 10    Creatinine 0.76    Sodium 140    Potassium 4.3    Chloride 105    Calcium 9.5    Albumin 4.5    Total Bilirubin 0.2    Alkaline Phosphatase 68    AST (SGOT) 12    ALT (SGPT) 7    WBC 5.40    Hemoglobin 13.0    Hematocrit 38.5    Platelets 291    Total Cholesterol 112    Triglycerides 55    HDL Cholesterol 40    LDL Cholesterol  59                       Assessment and Plan        Diagnoses and all orders for this visit:    1. Primary hypertension (Primary)  Comments:  Continue lisinopril.    2. Attention deficit hyperactivity disorder (ADHD), combined type  -     Qelbree 200 MG capsule sustained-release 24 hr; Take 1 capsule by mouth Daily.  Dispense: 30 capsule; Refill: 5    3. Generalized anxiety disorder  -     busPIRone (BUSPAR) 5 MG tablet; Take 1 tablet by mouth 2 (Two) Times a Day.  Dispense: 180 tablet; Refill: 1    4. Migraine without aura and without status migrainosus, not intractable  Comments:  Nurtec as needed.    5. Jittery feeling  Comments:  Take buspar at night and check blood sugar. increase water and salt intake.  Orders:  -     Blood Glucose Monitoring Suppl (OneTouch Verio) w/Device kit; 1 each 1 (One) Time for 1 dose.  Dispense: 1 kit; Refill: 0  -     ONE TOUCH CLUB LANCETS misc; 90 each 3 (Three) Times a Day As Needed (check blood sugar).  Dispense: 100 each; Refill: 3  -     glucose blood test strip; Use as instructed Dx: DM E11  Dispense: 100 each; Refill: 3    Continue medication as pt is doing well.   Monitor and increase calorie count to prevent weight loss.       Follow Up     Return in about 6 months (around 2/24/2024) for Next scheduled follow up.    Patient was given instructions and counseling  regarding her condition or for health maintenance advice. Please see specific information pulled into the AVS if appropriate.     ROBERTO CARLOS Carnes

## 2023-05-22 RX ORDER — LISINOPRIL 5 MG/1
TABLET ORAL
Qty: 30 TABLET | Refills: 2 | Status: SHIPPED | OUTPATIENT
Start: 2023-05-22

## 2023-08-15 RX ORDER — LISINOPRIL 5 MG/1
TABLET ORAL
Qty: 30 TABLET | Refills: 2 | Status: SHIPPED | OUTPATIENT
Start: 2023-08-15

## 2023-08-24 ENCOUNTER — OFFICE VISIT (OUTPATIENT)
Dept: FAMILY MEDICINE CLINIC | Facility: CLINIC | Age: 28
End: 2023-08-24
Payer: COMMERCIAL

## 2023-08-24 VITALS
TEMPERATURE: 98 F | BODY MASS INDEX: 19.11 KG/M2 | OXYGEN SATURATION: 98 % | DIASTOLIC BLOOD PRESSURE: 75 MMHG | SYSTOLIC BLOOD PRESSURE: 109 MMHG | WEIGHT: 118.4 LBS | HEART RATE: 89 BPM

## 2023-08-24 DIAGNOSIS — G43.009 MIGRAINE WITHOUT AURA AND WITHOUT STATUS MIGRAINOSUS, NOT INTRACTABLE: ICD-10-CM

## 2023-08-24 DIAGNOSIS — I10 PRIMARY HYPERTENSION: Primary | ICD-10-CM

## 2023-08-24 DIAGNOSIS — F90.2 ATTENTION DEFICIT HYPERACTIVITY DISORDER (ADHD), COMBINED TYPE: ICD-10-CM

## 2023-08-24 DIAGNOSIS — R45.0 JITTERY FEELING: ICD-10-CM

## 2023-08-24 DIAGNOSIS — F41.1 GENERALIZED ANXIETY DISORDER: ICD-10-CM

## 2023-08-24 PROCEDURE — 99214 OFFICE O/P EST MOD 30 MIN: CPT | Performed by: NURSE PRACTITIONER

## 2023-08-24 RX ORDER — BUSPIRONE HYDROCHLORIDE 5 MG/1
5 TABLET ORAL 2 TIMES DAILY
Qty: 180 TABLET | Refills: 1 | Status: SHIPPED | OUTPATIENT
Start: 2023-08-24

## 2023-08-24 RX ORDER — BLOOD-GLUCOSE METER
1 EACH MISCELLANEOUS ONCE
Qty: 1 KIT | Refills: 0 | Status: SHIPPED | OUTPATIENT
Start: 2023-08-24 | End: 2023-08-24

## 2023-08-24 RX ORDER — VILOXAZINE HYDROCHLORIDE 200 MG/1
1 CAPSULE, EXTENDED RELEASE ORAL DAILY
Qty: 30 CAPSULE | Refills: 5 | Status: SHIPPED | OUTPATIENT
Start: 2023-08-24

## 2023-08-24 RX ORDER — LANCETS
90 EACH MISCELLANEOUS 3 TIMES DAILY PRN
Qty: 100 EACH | Refills: 3 | Status: SHIPPED | OUTPATIENT
Start: 2023-08-24

## 2023-10-18 DIAGNOSIS — G43.009 MIGRAINE WITHOUT AURA AND WITHOUT STATUS MIGRAINOSUS, NOT INTRACTABLE: ICD-10-CM

## 2023-10-18 RX ORDER — LISINOPRIL 5 MG/1
TABLET ORAL
Qty: 30 TABLET | Refills: 2 | Status: SHIPPED | OUTPATIENT
Start: 2023-10-18

## 2023-10-18 RX ORDER — RIMEGEPANT SULFATE 75 MG/75MG
TABLET, ORALLY DISINTEGRATING ORAL
Qty: 8 TABLET | Refills: 2 | Status: SHIPPED | OUTPATIENT
Start: 2023-10-18

## 2023-12-05 ENCOUNTER — OFFICE VISIT (OUTPATIENT)
Dept: FAMILY MEDICINE CLINIC | Facility: CLINIC | Age: 28
End: 2023-12-05
Payer: COMMERCIAL

## 2023-12-05 VITALS
OXYGEN SATURATION: 99 % | DIASTOLIC BLOOD PRESSURE: 82 MMHG | BODY MASS INDEX: 18.99 KG/M2 | TEMPERATURE: 98.6 F | HEIGHT: 66 IN | WEIGHT: 118.2 LBS | HEART RATE: 98 BPM | SYSTOLIC BLOOD PRESSURE: 117 MMHG

## 2023-12-05 DIAGNOSIS — F41.1 GENERALIZED ANXIETY DISORDER: ICD-10-CM

## 2023-12-05 DIAGNOSIS — J06.9 VIRAL URI: ICD-10-CM

## 2023-12-05 DIAGNOSIS — F90.2 ATTENTION DEFICIT HYPERACTIVITY DISORDER (ADHD), COMBINED TYPE: Primary | ICD-10-CM

## 2023-12-05 PROCEDURE — 99214 OFFICE O/P EST MOD 30 MIN: CPT | Performed by: NURSE PRACTITIONER

## 2023-12-05 NOTE — PROGRESS NOTES
Chief Complaint  URI    Subjective      Alicia Rodriguez is a 28 year old female that presents to [facility] with c/o previous stomach flu and sinus congestion that started last week. She states that she had covid 3 weeks ago. She denies any current fever, body aches or chills. She is not taking any medications otc. 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     She is c/o decreased appetite and weight loss on her current ADHD medication. She is taking 200mg of Qelbree daily. She states that she cannot put on weight and cannot make her self eat more. When she does, she ends up vomiting.  She has tried drinking protein shakes as a replacement but has a hard time tolerating them. She is currently only eating about 1000 calories per day due to her decreased appetite. She admits that she is very active and burns about 800 calories per day. She feels like her ADHD is well controlled but she is still forgetful at times.        History of Present Illness    Current Outpatient Medications   Medication Instructions   • atomoxetine (STRATTERA) 40 mg, Oral, Daily   • busPIRone (BUSPAR) 5 mg, Oral, 2 Times Daily   • EPINEPHrine (EPIPEN) 0.3 MG/0.3ML solution auto-injector injection    • glucose blood test strip Use as instructed Dx: DM E11   • linaclotide (LINZESS) 145 mcg, Oral, Every Morning Before Breakfast   • lisinopril (PRINIVIL,ZESTRIL) 5 MG tablet TAKE 1 TABLET BY MOUTH EVERY DAY   • Nurtec 75 MG dispersible tablet TAKE 1 TABLET BY MOUTH AS NEEDED FOR MIGRAINE   • ondansetron (ZOFRAN) 4 mg, Oral, Every 8 Hours PRN   • ONE TOUCH CLUB LANCETS misc 90 each, Does not apply, 3 Times Daily PRN       The following portions of the patient's history were reviewed and updated as appropriate: allergies, current medications, past family history, past medical history, past social history, past surgical history, and problem  "list.    Objective   Vital Signs:   /82   Pulse 98   Temp 98.6 °F (37 °C)   Ht 167.6 cm (66\")   Wt 53.6 kg (118 lb 3.2 oz)   SpO2 99%   BMI 19.08 kg/m²     Wt Readings from Last 3 Encounters:   12/05/23 53.6 kg (118 lb 3.2 oz)   08/24/23 53.7 kg (118 lb 6.4 oz)   07/11/23 54.9 kg (121 lb)     BP Readings from Last 3 Encounters:   12/05/23 117/82   08/24/23 109/75   08/21/23 135/87     Physical Exam  Vitals reviewed.   Constitutional:       Appearance: Normal appearance. She is well-developed. She is not ill-appearing.   HENT:      Head: Normocephalic and atraumatic.   Eyes:      Conjunctiva/sclera: Conjunctivae normal.      Pupils: Pupils are equal, round, and reactive to light.   Cardiovascular:      Rate and Rhythm: Normal rate and regular rhythm.   Pulmonary:      Effort: Pulmonary effort is normal.      Breath sounds: Normal breath sounds.   Skin:     General: Skin is warm and dry.   Neurological:      Mental Status: She is alert and oriented to person, place, and time.   Psychiatric:         Mood and Affect: Mood and affect normal.         Behavior: Behavior normal.        Result Review :  The following data was reviewed by: ROBERTO CARLOS Boucher on 12/05/2023:            Lab Results (last 72 hours)       ** No results found for the last 72 hours. **             No Images in the past 120 days found..    Lab Results   Component Value Date    SARSANTIGEN Not Detected 08/21/2023    RAPFLUA Negative 08/21/2023    RAPFLUB Negative 08/21/2023    RAPSCRN Negative 08/21/2023    BILIRUBINUR 1 mg/dL (A) 07/09/2023    POCGLU Normal 07/09/2023       Procedures        Assessment and Plan   Diagnoses and all orders for this visit:    1. Attention deficit hyperactivity disorder (ADHD), combined type (Primary)  -     atomoxetine (Strattera) 40 MG capsule; Take 1 capsule by mouth Daily.  Dispense: 30 capsule; Refill: 2    2. Generalized anxiety disorder  -     atomoxetine (Strattera) 40 MG capsule; Take 1 " capsule by mouth Daily.  Dispense: 30 capsule; Refill: 2    3. Viral URI        BMI is within normal parameters. No other follow-up for BMI required.         Medications Discontinued During This Encounter   Medication Reason   • Qelbree 200 MG capsule sustained-release 24 hr Alternate therapy          Follow Up   Return in about 4 weeks (around 1/2/2024).  Patient was given instructions and counseling regarding her condition or for health maintenance advice. Please see specific information pulled into the AVS if appropriate.     Increase water intake. You can also try an otc antihistamine such as claritin or zyrtec. We are switching your qelbree over to strattera. Eat small frequent meals throughout the day with increased protein.    ROBERTO CARLOS Boucher  12/06/23  11:28 EST

## 2023-12-06 RX ORDER — ATOMOXETINE 40 MG/1
40 CAPSULE ORAL DAILY
Qty: 30 CAPSULE | Refills: 2 | Status: SHIPPED | OUTPATIENT
Start: 2023-12-06

## 2023-12-06 NOTE — PATIENT INSTRUCTIONS
Increase water intake. You can also try an otc antihistamine such as claritin or zyrtec. We are switching your qelbree over to strattera. Eat small frequent meals throughout the day with increased protein.

## 2024-03-13 ENCOUNTER — HOSPITAL ENCOUNTER (EMERGENCY)
Facility: HOSPITAL | Age: 29
Discharge: HOME | End: 2024-03-13
Attending: EMERGENCY MEDICINE | Admitting: EMERGENCY MEDICINE
Payer: OTHER MISCELLANEOUS

## 2024-03-13 ENCOUNTER — APPOINTMENT (OUTPATIENT)
Dept: CT IMAGING | Facility: HOSPITAL | Age: 29
End: 2024-03-13
Payer: OTHER MISCELLANEOUS

## 2024-03-13 VITALS
DIASTOLIC BLOOD PRESSURE: 84 MMHG | BODY MASS INDEX: 21.61 KG/M2 | HEART RATE: 81 BPM | HEIGHT: 66 IN | TEMPERATURE: 98.3 F | WEIGHT: 134.48 LBS | RESPIRATION RATE: 21 BRPM | OXYGEN SATURATION: 98 % | SYSTOLIC BLOOD PRESSURE: 124 MMHG

## 2024-03-13 DIAGNOSIS — S06.0X0A CONCUSSION WITHOUT LOSS OF CONSCIOUSNESS, INITIAL ENCOUNTER: Primary | ICD-10-CM

## 2024-03-13 LAB
ALBUMIN SERPL-MCNC: 3.9 G/DL (ref 3.5–5.2)
ALBUMIN/GLOB SERPL: 1.3 G/DL
ALP SERPL-CCNC: 57 U/L (ref 39–117)
ALT SERPL W P-5'-P-CCNC: 16 U/L (ref 1–33)
ANION GAP SERPL CALCULATED.3IONS-SCNC: 9 MMOL/L (ref 5–15)
AST SERPL-CCNC: 16 U/L (ref 1–32)
BASOPHILS # BLD AUTO: 0.07 10*3/MM3 (ref 0–0.2)
BASOPHILS NFR BLD AUTO: 0.8 % (ref 0–1.5)
BILIRUB SERPL-MCNC: 0.3 MG/DL (ref 0–1.2)
BUN SERPL-MCNC: 11 MG/DL (ref 6–20)
BUN/CREAT SERPL: 16.2 (ref 7–25)
CALCIUM SPEC-SCNC: 9 MG/DL (ref 8.6–10.5)
CHLORIDE SERPL-SCNC: 104 MMOL/L (ref 98–107)
CO2 SERPL-SCNC: 25 MMOL/L (ref 22–29)
CREAT SERPL-MCNC: 0.68 MG/DL (ref 0.57–1)
DEPRECATED RDW RBC AUTO: 37 FL (ref 37–54)
EGFRCR SERPLBLD CKD-EPI 2021: 121.8 ML/MIN/1.73
EOSINOPHIL # BLD AUTO: 0.31 10*3/MM3 (ref 0–0.4)
EOSINOPHIL NFR BLD AUTO: 3.4 % (ref 0.3–6.2)
ERYTHROCYTE [DISTWIDTH] IN BLOOD BY AUTOMATED COUNT: 12.2 % (ref 12.3–15.4)
GLOBULIN UR ELPH-MCNC: 3 GM/DL
GLUCOSE SERPL-MCNC: 89 MG/DL (ref 65–99)
HCG INTACT+B SERPL-ACNC: <0.5 MIU/ML
HCT VFR BLD AUTO: 38.3 % (ref 34–46.6)
HGB BLD-MCNC: 12.4 G/DL (ref 12–15.9)
IMM GRANULOCYTES # BLD AUTO: 0.03 10*3/MM3 (ref 0–0.05)
IMM GRANULOCYTES NFR BLD AUTO: 0.3 % (ref 0–0.5)
LYMPHOCYTES # BLD AUTO: 1.71 10*3/MM3 (ref 0.7–3.1)
LYMPHOCYTES NFR BLD AUTO: 18.8 % (ref 19.6–45.3)
MCH RBC QN AUTO: 27.1 PG (ref 26.6–33)
MCHC RBC AUTO-ENTMCNC: 32.4 G/DL (ref 31.5–35.7)
MCV RBC AUTO: 83.8 FL (ref 79–97)
MONOCYTES # BLD AUTO: 0.77 10*3/MM3 (ref 0.1–0.9)
MONOCYTES NFR BLD AUTO: 8.4 % (ref 5–12)
NEUTROPHILS NFR BLD AUTO: 6.23 10*3/MM3 (ref 1.7–7)
NEUTROPHILS NFR BLD AUTO: 68.3 % (ref 42.7–76)
NRBC BLD AUTO-RTO: 0 /100 WBC (ref 0–0.2)
PLATELET # BLD AUTO: 251 10*3/MM3 (ref 140–450)
PMV BLD AUTO: 8.8 FL (ref 6–12)
POTASSIUM SERPL-SCNC: 4.4 MMOL/L (ref 3.5–5.2)
PROT SERPL-MCNC: 6.9 G/DL (ref 6–8.5)
RBC # BLD AUTO: 4.57 10*6/MM3 (ref 3.77–5.28)
SODIUM SERPL-SCNC: 138 MMOL/L (ref 136–145)
WBC NRBC COR # BLD AUTO: 9.12 10*3/MM3 (ref 3.4–10.8)

## 2024-03-13 PROCEDURE — 36415 COLL VENOUS BLD VENIPUNCTURE: CPT

## 2024-03-13 PROCEDURE — 99284 EMERGENCY DEPT VISIT MOD MDM: CPT

## 2024-03-13 PROCEDURE — 84702 CHORIONIC GONADOTROPIN TEST: CPT

## 2024-03-13 PROCEDURE — 85025 COMPLETE CBC W/AUTO DIFF WBC: CPT

## 2024-03-13 PROCEDURE — 80053 COMPREHEN METABOLIC PANEL: CPT

## 2024-03-13 PROCEDURE — 63710000001 ONDANSETRON ODT 4 MG TABLET DISPERSIBLE: Performed by: EMERGENCY MEDICINE

## 2024-03-13 PROCEDURE — 70450 CT HEAD/BRAIN W/O DYE: CPT

## 2024-03-13 RX ORDER — ACETAMINOPHEN 325 MG/1
975 TABLET ORAL ONCE
Status: COMPLETED | OUTPATIENT
Start: 2024-03-13 | End: 2024-03-13

## 2024-03-13 RX ORDER — ONDANSETRON 4 MG/1
4 TABLET, ORALLY DISINTEGRATING ORAL ONCE
Status: COMPLETED | OUTPATIENT
Start: 2024-03-13 | End: 2024-03-13

## 2024-03-13 RX ADMIN — ONDANSETRON 4 MG: 4 TABLET, ORALLY DISINTEGRATING ORAL at 10:12

## 2024-03-13 RX ADMIN — ACETAMINOPHEN 975 MG: 325 TABLET ORAL at 10:11

## 2024-03-13 NOTE — Clinical Note
[facility] EMERGENCY ROOM  [address] [address]  Phone: [phone]  Fax: [phone]    Alicia Rodriguez was seen and treated in our emergency department on 3/13/2024.  She may return to work on 03/18/2024.         Thank you for choosing [facility].    Byron Patterson PA-C

## 2024-03-13 NOTE — Clinical Note
[facility] EMERGENCY ROOM  [address] [place] [address]  Phone: [phone]  Fax: [phone]    Ambrocio Rodriguez accompanied Alicia Rodriguez to the emergency department on 3/13/2024. They may return to work on 03/14/2024.        Thank you for choosing [facility].    Rachid Cummings MD

## 2024-03-13 NOTE — Clinical Note
[facility] EMERGENCY ROOM  [address] [address]  Phone: [phone]  Fax: [phone]    Alicia Rodriguez was seen and treated in our emergency department on 3/13/2024.  She may return to work on 03/18/2024.         Thank you for choosing [facility].    Byron Patterson PA-C
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
## 2024-05-02 ENCOUNTER — TELEMEDICINE (OUTPATIENT)
Dept: FAMILY MEDICINE CLINIC | Facility: TELEHEALTH | Age: 29
End: 2024-05-02
Payer: COMMERCIAL

## 2024-05-02 VITALS — TEMPERATURE: 97.4 F

## 2024-05-02 DIAGNOSIS — N39.0 URINARY TRACT INFECTION WITHOUT HEMATURIA, SITE UNSPECIFIED: Primary | ICD-10-CM

## 2024-05-02 DIAGNOSIS — J06.9 ACUTE URI: ICD-10-CM

## 2024-05-02 PROBLEM — H91.93 HEARING LOSS OF BOTH EARS: Status: RESOLVED | Noted: 2021-07-01 | Resolved: 2024-05-02

## 2024-05-02 PROBLEM — J32.9 SINUSITIS: Status: RESOLVED | Noted: 2021-07-01 | Resolved: 2024-05-02

## 2024-05-02 PROCEDURE — 87086 URINE CULTURE/COLONY COUNT: CPT | Performed by: NURSE PRACTITIONER

## 2024-05-02 RX ORDER — CEFDINIR 300 MG/1
300 CAPSULE ORAL 2 TIMES DAILY
Qty: 10 CAPSULE | Refills: 0 | Status: SHIPPED | OUTPATIENT
Start: 2024-05-02 | End: 2024-05-07

## 2024-05-02 NOTE — PROGRESS NOTES
Chief Complaint   Patient presents with    Cough    Urinary Tract Infection       Video Visit Reason:   Free Text Description: Lower back pain, cough with rib pain  Subjective   Alicia Rodriguez is a 28 y.o. female.     History of Present Illness  She has low back pain and urinary urgency, frequency and aching with cough that all started about 2 days ago. Monday evening, she ran a temperature up to 102 but Tuesday the temp was gone. Since then she has had low back pain with UTI symptoms, cough with rib pain on the right side.  Cough  This is a new problem. Episode onset: 2 days ago. The problem occurs intermittent. The cough is Dry. Associated symptoms include chest pain (right side of ribs, low), a fever (on Monday evening, Tuesday morning of 101-102 but none since), myalgias (low back pain and right sided pain below ribs) and postnasal drip. Pertinent negatives include no chills, nasal congestion, rhinorrhea, sore throat, shortness of breath or wheezing. Nothing aggravates the symptoms. Her past medical history is significant for environmental allergies.   Urinary Tract Infection   This is a new problem. Episode onset: 2 days. The problem occurs constantly. The quality of the pain is described as aching. Maximum temperature: none today, had fever 2 days ago. Associated symptoms include frequency and urgency. Pertinent negatives include no chills, flank pain, hematuria, nausea or vomiting. Associated symptoms comments: Low back pain. She has tried increased fluids for the symptoms.       The following portions of the patient's history were reviewed and updated as appropriate: allergies, current medications, past medical history, and problem list.      Past Medical History:   Diagnosis Date    ADHD     Anxiety disorder 11/20/2017    Chronic allergic rhinitis     H/O bilateral salpingectomy 08/04/2022    Hypertension     Hypothyroidism 02/27/2020    Migraine headache 02/10/2020     Social History     Socioeconomic  History    Marital status:    Tobacco Use    Smoking status: Never     Passive exposure: Never    Smokeless tobacco: Never   Vaping Use    Vaping status: Never Used   Substance and Sexual Activity    Alcohol use: Yes     Comment: Rarely    Drug use: Defer    Sexual activity: Not Currently     Partners: Male     Birth control/protection: Surgical     medication documentation: reviewed and updated with patient and   Current Outpatient Medications:     EPINEPHrine (EPIPEN) 0.3 MG/0.3ML solution auto-injector injection, , Disp: , Rfl:     Nurtec 75 MG dispersible tablet, TAKE 1 TABLET BY MOUTH AS NEEDED FOR MIGRAINE, Disp: 8 tablet, Rfl: 2    cefdinir (OMNICEF) 300 MG capsule, Take 1 capsule by mouth 2 (Two) Times a Day for 5 days., Disp: 10 capsule, Rfl: 0    Current Facility-Administered Medications:     promethazine (PHENERGAN) injection 12.5 mg, 12.5 mg, Intramuscular, Q6H PRN, Thomas Israel, DO, 12.5 mg at 07/11/23 1447  Review of Systems   Constitutional:  Positive for fever (on Monday evening, Tuesday morning of 101-102 but none since). Negative for appetite change and chills.   HENT:  Positive for postnasal drip. Negative for rhinorrhea, sinus pressure, sinus pain and sore throat.    Respiratory:  Positive for cough and chest tightness. Negative for shortness of breath and wheezing.    Cardiovascular:  Positive for chest pain (right side of ribs, low).   Gastrointestinal:  Negative for nausea and vomiting.   Genitourinary:  Positive for dysuria, frequency and urgency. Negative for difficulty urinating, flank pain and hematuria.   Musculoskeletal:  Positive for back pain (low back pain bilaterally) and myalgias (low back pain and right sided pain below ribs).   Allergic/Immunologic: Positive for environmental allergies.       Objective   Temp 97.4 °F (36.3 °C)   LMP 04/26/2024   Tyto Exam  Physical Exam  Constitutional:       General: She is not in acute distress.     Appearance: She is  well-developed. She is not ill-appearing.   HENT:      Head: Normocephalic and atraumatic.      Nose: No congestion.      Mouth/Throat:      Pharynx: Uvula midline. Posterior oropharyngeal erythema (cobblestone appearance) present. No pharyngeal swelling, oropharyngeal exudate or uvula swelling.   Eyes:      Conjunctiva/sclera: Conjunctivae normal.   Pulmonary:      Effort: Pulmonary effort is normal.      Breath sounds: Normal breath sounds.   Abdominal:      General: There is no distension.      Tenderness: There is no abdominal tenderness.   Neurological:      Mental Status: She is alert.   Psychiatric:         Mood and Affect: Mood normal.         Speech: Speech normal.         Assessment & Plan   Diagnoses and all orders for this visit:    1. Urinary tract infection without hematuria, site unspecified (Primary)  -     Urine Culture - Urine, Urine, Clean Catch; Future  -     cefdinir (OMNICEF) 300 MG capsule; Take 1 capsule by mouth 2 (Two) Times a Day for 5 days.  Dispense: 10 capsule; Refill: 0    2. Acute URI  -     POC Strep A, PCR (Roche Stephane); Future  -     STEPHANE FLU + SARS PCR; Future                    Follow Up:  If your symptoms are not resolving by the completion of your treatment or are worsening, see your primary care provider for follow up. If you don't have a primary care provider, you may go to any Urgent Care for re-evaluation. If you develop any life threatening symptoms, go to the nearest Emergency Department immediately or call EMS.               The use of  Video Visit was utilized during this visit, using both Flud and [employer]Epic. The use of a video visit has been reviewed with the patient and verbal informed consent has been obtained. No technical difficulties occurred during the visit.    is located at [address] [facility] [place] [address]  Provider is located at [place], [place]

## 2024-06-11 ENCOUNTER — TELEPHONE (OUTPATIENT)
Dept: FAMILY MEDICINE CLINIC | Facility: CLINIC | Age: 29
End: 2024-06-11
Payer: COMMERCIAL

## 2024-06-11 NOTE — TELEPHONE ENCOUNTER
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 PATIENT WAS NOT AVAILABLE FOR THE 2 APPOINTMENTS OPEN AND WOULD LIKE A CALL BACK TO SEE IF SHE CAN BE PUT ON THE SCHEDULE AT SOME OTHER DATE.

## 2024-09-21 DIAGNOSIS — G43.009 MIGRAINE WITHOUT AURA AND WITHOUT STATUS MIGRAINOSUS, NOT INTRACTABLE: ICD-10-CM

## 2024-09-23 RX ORDER — RIMEGEPANT SULFATE 75 MG/75MG
TABLET, ORALLY DISINTEGRATING ORAL
Qty: 8 TABLET | Refills: 0 | OUTPATIENT
Start: 2024-09-23

## 2024-10-01 DIAGNOSIS — G43.009 MIGRAINE WITHOUT AURA AND WITHOUT STATUS MIGRAINOSUS, NOT INTRACTABLE: ICD-10-CM

## 2024-10-02 RX ORDER — RIMEGEPANT SULFATE 75 MG/75MG
TABLET, ORALLY DISINTEGRATING ORAL
Qty: 8 TABLET | Refills: 0 | OUTPATIENT
Start: 2024-10-02

## 2024-11-01 NOTE — PROGRESS NOTES
GYN Problem/Follow Up Visit    Chief Complaint   Patient presents with    Menorrhagia     Heavy with clots, lasts about 3-4 days           HPI  Alicia Rodriguez is a 28 y.o. female, , who presents for heavy menstrual periods. Menses occur monthly, lasting 3-6 days, on heavy days, pass large clots, saturate ultra pad every hour, bleeding onto clothes. Severe menstrual cramps.     Last use of contraception        IGP,rfx Aptima HPV All Pth (2022 14:56)     Additional OB/GYN History   Patient's last menstrual period was 10/23/2024 (exact date).  Current contraception: contraceptive methods: Bilateral Salpingectomy,     Past Medical History:   Diagnosis Date    ADHD     Anxiety disorder 2017    Chronic allergic rhinitis     Depression     H/O bilateral salpingectomy 2022    Hypertension     Hypothyroidism 2020    Migraine headache 02/10/2020    with aura      Past Surgical History:   Procedure Laterality Date    APPENDECTOMY      CHOLECYSTECTOMY      SALPINGECTOMY Bilateral 2022    Procedure: SALPINGECTOMY LAPAROSCOPIC;  Surgeon: Morgan Cheng MD;  Location: [facility];  Service: Gynecology;  Laterality: Bilateral;    TONSILLECTOMY        Family History   Problem Relation Age of Onset    Hypertension Father     Hypertension Mother     Diabetes Mother         Unspecified type    Kidney nephrosis Mother     Heart disease Paternal Grandfather     Prostate cancer Paternal Grandfather     Colon cancer Paternal Grandfather         70s    Diabetes Maternal Grandmother         Unspecified type    Hypertension Maternal Grandfather     Diabetes Maternal Grandfather         Unspecified type    Hypothyroidism Other     Malig Hyperthermia Neg Hx     Breast cancer Neg Hx     Ovarian cancer Neg Hx     Uterine cancer Neg Hx      Allergies as of 2024    (No Known Allergies)      The additional following portions of the patient's history were reviewed and updated as appropriate:  "allergies, current medications, past family history, past medical history, past social history, past surgical history, and problem list.    Review of Systems    See HPI for pertinent ROS    Objective   /70   Pulse 82   Ht 167.6 cm (66\")   Wt 67.6 kg (149 lb)   LMP 10/23/2024 (Exact Date)   BMI 24.05 kg/m²     Physical Exam  Vitals and nursing note reviewed. Exam conducted with a chaperone present.   Constitutional:       Appearance: Normal appearance.   Cardiovascular:      Rate and Rhythm: Normal rate.   Pulmonary:      Effort: Pulmonary effort is normal.   Genitourinary:     General: Normal vulva.      Vagina: Normal.      Cervix: Normal.      Uterus: Normal.       Adnexa: Right adnexa normal and left adnexa normal.   Lymphadenopathy:      Lower Body: No right inguinal adenopathy. No left inguinal adenopathy.   Skin:     General: Skin is warm and dry.   Neurological:      Mental Status: She is alert and oriented to person, place, and time.          Assessment and Plan    Diagnoses and all orders for this visit:    1. Menorrhagia with irregular cycle (Primary)  -     POC Pregnancy, Urine  -     CBC (No Diff)  -     T4, Free  -     TSH  -     Prolactin  -     US Non-ob Transvaginal; Future      HCG, Urine, QL   Date Value Ref Range Status   11/04/2024 Negative Negative Final       Counseling:  TRACK MENSES, RTO if <q21d, >7d long, heavy or painful.    All BIRTH CONTROL options R/B/A/SE/E of each reviewed in detail.  SAFE SEX/condoms importance reviewed.    All treatment options with regard to pt hx NLT hormonal, surgical, expectant R/B/A/SE/E        She understands the importance of having any ordered tests to be performed in a timely fashion.  The risks of not performing them include, but are not limited to, advanced cancer stages, bone loss from osteoporosis and/or subsequent increase in morbidity and/or mortality.  She is encouraged to review her results online and/or contact or office if she has " questions.     Follow Up:  Return if symptoms worsen or fail to improve, for will call with results of ultrasound and treatment recommendations.        Elisa Reed, APRN  11/04/2024

## 2024-11-04 ENCOUNTER — OFFICE VISIT (OUTPATIENT)
Dept: OBSTETRICS AND GYNECOLOGY | Facility: CLINIC | Age: 29
End: 2024-11-04
Payer: COMMERCIAL

## 2024-11-04 VITALS
BODY MASS INDEX: 23.95 KG/M2 | SYSTOLIC BLOOD PRESSURE: 118 MMHG | WEIGHT: 149 LBS | DIASTOLIC BLOOD PRESSURE: 70 MMHG | HEART RATE: 82 BPM | HEIGHT: 66 IN

## 2024-11-04 DIAGNOSIS — N92.1 MENORRHAGIA WITH IRREGULAR CYCLE: Primary | ICD-10-CM

## 2024-11-04 LAB
B-HCG UR QL: NEGATIVE
DEPRECATED RDW RBC AUTO: 36.7 FL (ref 37–54)
ERYTHROCYTE [DISTWIDTH] IN BLOOD BY AUTOMATED COUNT: 12.2 % (ref 12.3–15.4)
EXPIRATION DATE: NORMAL
HCT VFR BLD AUTO: 39.1 % (ref 34–46.6)
HGB BLD-MCNC: 12.7 G/DL (ref 12–15.9)
INTERNAL NEGATIVE CONTROL: NORMAL
INTERNAL POSITIVE CONTROL: NORMAL
Lab: NORMAL
MCH RBC QN AUTO: 27.1 PG (ref 26.6–33)
MCHC RBC AUTO-ENTMCNC: 32.5 G/DL (ref 31.5–35.7)
MCV RBC AUTO: 83.4 FL (ref 79–97)
PLATELET # BLD AUTO: 293 10*3/MM3 (ref 140–450)
PMV BLD AUTO: 9.6 FL (ref 6–12)
PROLACTIN SERPL-MCNC: 12 NG/ML (ref 4.79–23.3)
RBC # BLD AUTO: 4.69 10*6/MM3 (ref 3.77–5.28)
T4 FREE SERPL-MCNC: 1.11 NG/DL (ref 0.92–1.68)
TSH SERPL DL<=0.05 MIU/L-ACNC: 2.65 UIU/ML (ref 0.27–4.2)
WBC NRBC COR # BLD AUTO: 7.07 10*3/MM3 (ref 3.4–10.8)

## 2024-11-04 PROCEDURE — 84443 ASSAY THYROID STIM HORMONE: CPT | Performed by: NURSE PRACTITIONER

## 2024-11-04 PROCEDURE — 84146 ASSAY OF PROLACTIN: CPT | Performed by: NURSE PRACTITIONER

## 2024-11-04 PROCEDURE — 85027 COMPLETE CBC AUTOMATED: CPT | Performed by: NURSE PRACTITIONER

## 2024-11-04 PROCEDURE — 84439 ASSAY OF FREE THYROXINE: CPT | Performed by: NURSE PRACTITIONER

## 2024-11-04 PROCEDURE — 99459 PELVIC EXAMINATION: CPT | Performed by: NURSE PRACTITIONER

## 2024-11-04 PROCEDURE — 81025 URINE PREGNANCY TEST: CPT | Performed by: NURSE PRACTITIONER

## 2024-11-04 PROCEDURE — 99213 OFFICE O/P EST LOW 20 MIN: CPT | Performed by: NURSE PRACTITIONER

## 2024-11-18 ENCOUNTER — HOSPITAL ENCOUNTER (OUTPATIENT)
Dept: ULTRASOUND IMAGING | Facility: HOSPITAL | Age: 29
Discharge: HOME | End: 2024-11-18
Admitting: NURSE PRACTITIONER
Payer: COMMERCIAL

## 2024-11-18 DIAGNOSIS — N92.1 MENORRHAGIA WITH IRREGULAR CYCLE: ICD-10-CM

## 2024-11-18 PROCEDURE — 76830 TRANSVAGINAL US NON-OB: CPT

## 2024-11-21 DIAGNOSIS — N83.201 CYST OF RIGHT OVARY: Primary | ICD-10-CM

## 2024-11-27 ENCOUNTER — TELEPHONE (OUTPATIENT)
Dept: OBSTETRICS AND GYNECOLOGY | Facility: CLINIC | Age: 29
End: 2024-11-27
Payer: COMMERCIAL

## 2024-11-27 NOTE — TELEPHONE ENCOUNTER
----- Message from Elisa Reed sent at 11/21/2024  8:19 AM EST -----  You labwork was normal. The pelvic ultrasound detected a small ovarian cyst. Complex ovarian cyst  can be solid or partially solid, may contain septations. Most ovarian cysts are noncancerous however recommend repeating pelvic ultrasound in 6 weeks to assure complete resolution. These cysts have various causes and often resolve on their own. I recommend hormone management of heavy menstrual cycles, hormones contraceptives such as pill, long acting IUD, Nexplanon, depo will often effectively lighten, shorten and or stop menstrual bleeding, additionally will help reduce the formation of ovarian cysts. Due to her history of igh blood pressure and migraines with aura, we would recommend progestin only hormones that do not contain estrogen. IF she would like to try progestin only pill, I will be glad to send script, and would recommend seeing her back in 3 months to assess menstrual bleeding. If she desires long acting contraception or would like to discuss treatment options, schedule follow up. Orders placed for follow up pelvic ultrasound.

## 2024-11-27 NOTE — TELEPHONE ENCOUNTER
Patient informed of her results and recommendations. She will call to schedule the 6 week follow up ultrasound. She does desire to get the script for the POP sent to her pharmacy. She is going to do some research and let us know if she decides to start it to set up 3 month follow up.

## 2024-12-01 RX ORDER — ACETAMINOPHEN AND CODEINE PHOSPHATE 120; 12 MG/5ML; MG/5ML
1 SOLUTION ORAL DAILY
Qty: 84 TABLET | Refills: 0 | Status: SHIPPED | OUTPATIENT
Start: 2024-12-01

## 2024-12-10 ENCOUNTER — HOSPITAL ENCOUNTER (EMERGENCY)
Facility: HOSPITAL | Age: 29
Discharge: HOME | End: 2024-12-10
Attending: EMERGENCY MEDICINE | Admitting: EMERGENCY MEDICINE
Payer: OTHER MISCELLANEOUS

## 2024-12-10 ENCOUNTER — APPOINTMENT (OUTPATIENT)
Dept: CT IMAGING | Facility: HOSPITAL | Age: 29
End: 2024-12-10
Payer: OTHER MISCELLANEOUS

## 2024-12-10 ENCOUNTER — APPOINTMENT (OUTPATIENT)
Dept: GENERAL RADIOLOGY | Facility: HOSPITAL | Age: 29
End: 2024-12-10
Payer: OTHER MISCELLANEOUS

## 2024-12-10 VITALS
HEIGHT: 65 IN | OXYGEN SATURATION: 99 % | SYSTOLIC BLOOD PRESSURE: 140 MMHG | RESPIRATION RATE: 16 BRPM | TEMPERATURE: 98.6 F | DIASTOLIC BLOOD PRESSURE: 97 MMHG | WEIGHT: 142.2 LBS | BODY MASS INDEX: 23.69 KG/M2 | HEART RATE: 81 BPM
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
## 2024-12-10 DIAGNOSIS — S06.0X0A CONCUSSION WITHOUT LOSS OF CONSCIOUSNESS, INITIAL ENCOUNTER: Primary | ICD-10-CM

## 2024-12-10 PROCEDURE — 99284 EMERGENCY DEPT VISIT MOD MDM: CPT

## 2024-12-10 PROCEDURE — 72050 X-RAY EXAM NECK SPINE 4/5VWS: CPT

## 2024-12-10 PROCEDURE — 70450 CT HEAD/BRAIN W/O DYE: CPT

## 2024-12-10 PROCEDURE — 70486 CT MAXILLOFACIAL W/O DYE: CPT

## 2024-12-10 RX ORDER — HYDROCODONE BITARTRATE AND ACETAMINOPHEN 5; 325 MG/1; MG/1
1 TABLET ORAL ONCE
Status: COMPLETED | OUTPATIENT
Start: 2024-12-10 | End: 2024-12-10

## 2024-12-10 RX ADMIN — HYDROCODONE BITARTRATE AND ACETAMINOPHEN 1 TABLET: 5; 325 TABLET ORAL at 12:24

## 2024-12-10 NOTE — DISCHARGE INSTRUCTIONS
Your x-ray and CT completed emergency department today look well.  Follow-up concussion protocol as directed.  Return to the emergency department for intractable headache, tractable vomiting, syncope, other symptoms concerning to you.
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
## 2025-01-02 ENCOUNTER — HOSPITAL ENCOUNTER (OUTPATIENT)
Dept: ULTRASOUND IMAGING | Facility: HOSPITAL | Age: 30
Discharge: HOME | End: 2025-01-02
Admitting: NURSE PRACTITIONER
Payer: COMMERCIAL

## 2025-01-02 DIAGNOSIS — N83.201 CYST OF RIGHT OVARY: ICD-10-CM

## 2025-01-02 PROCEDURE — 76830 TRANSVAGINAL US NON-OB: CPT

## 2025-06-15 PROCEDURE — 87086 URINE CULTURE/COLONY COUNT: CPT | Performed by: NURSE PRACTITIONER

## (undated) DEVICE — LIGHT SLEEVE: Brand: DEVON

## (undated) DEVICE — LAPAROSCOPIC TROCAR SLEEVE/SINGLE USE: Brand: KII® OPTICAL ACCESS SYSTEM

## (undated) DEVICE — ENDOPATH PNEUMONEEDLE INSUFFLATION NEEDLES WITH LUER LOCK CONNECTORS 120MM: Brand: ENDOPATH

## (undated) DEVICE — SYR LL TP 10ML STRL

## (undated) DEVICE — GLV SURG BIOGEL LTX PF 7 1/2

## (undated) DEVICE — ADHS LIQ MASTISOL 2/3ML

## (undated) DEVICE — 3M™ STERI-STRIP™ REINFORCED ADHESIVE SKIN CLOSURES, R1547, 1/2 IN X 4 IN (12 MM X 100 MM), 6 STRIPS/ENVELOPE: Brand: 3M™ STERI-STRIP™

## (undated) DEVICE — CATH URETH INTRMIT ALLPURP LTX 16F RED

## (undated) DEVICE — TBG INSUFFLATION W/CPC CONNEC: Brand: MEDLINE INDUSTRIES, INC.

## (undated) DEVICE — APPL CHLORAPREP HI/LITE 26ML ORNG

## (undated) DEVICE — DUAL LUMEN STOMACH TUBE,ANTI-REFLUX VALVE: Brand: SALEM SUMP

## (undated) DEVICE — ENDOPATH XCEL BLADELESS TROCARS WITH STABILITY SLEEVES: Brand: ENDOPATH XCEL

## (undated) DEVICE — NDL HYPO ECLPS SFTY 22G 1 1/2IN

## (undated) DEVICE — INTENDED FOR TISSUE SEPARATION, AND OTHER PROCEDURES THAT REQUIRE A SHARP SURGICAL BLADE TO PUNCTURE OR CUT.: Brand: BARD-PARKER ® CARBON RIB-BACK BLADES

## (undated) DEVICE — TRY PREP SCRB VAG PVP

## (undated) DEVICE — KT ANTI FOG W/FLD AND SPNG

## (undated) DEVICE — PCH SURG INST LAP 2 LF

## (undated) DEVICE — LITHOTOMY-YELLOW FINS: Brand: MEDLINE INDUSTRIES, INC.

## (undated) DEVICE — SLV SCD KN/LEN ADJ EXPRSS BLENDED MD 1P/U

## (undated) DEVICE — SUT MNCRYL PLS ANTIB UD 4/0 PS2 18IN

## (undated) DEVICE — ENDOPATH XCEL WITH OPTIVIEW TECHNOLOGY UNIVERSAL TROCAR STABILITY SLEEVES: Brand: ENDOPATH XCEL OPTIVIEW

## (undated) DEVICE — MARYLAND JAW LAPAROSCOPIC SEALER/DIVIDER COATED: Brand: LIGASURE

## (undated) DEVICE — GLV SURG BIOGEL LTX PF 7